# Patient Record
Sex: FEMALE | Race: WHITE | HISPANIC OR LATINO
[De-identification: names, ages, dates, MRNs, and addresses within clinical notes are randomized per-mention and may not be internally consistent; named-entity substitution may affect disease eponyms.]

---

## 2018-10-18 PROBLEM — Z00.00 ENCOUNTER FOR PREVENTIVE HEALTH EXAMINATION: Status: ACTIVE | Noted: 2018-10-18

## 2019-01-15 ENCOUNTER — APPOINTMENT (OUTPATIENT)
Dept: RHEUMATOLOGY | Facility: CLINIC | Age: 54
End: 2019-01-15

## 2019-04-02 ENCOUNTER — APPOINTMENT (OUTPATIENT)
Dept: RHEUMATOLOGY | Facility: CLINIC | Age: 54
End: 2019-04-02

## 2019-05-20 ENCOUNTER — APPOINTMENT (OUTPATIENT)
Dept: NEUROSURGERY | Facility: CLINIC | Age: 54
End: 2019-05-20
Payer: MEDICAID

## 2019-05-20 VITALS
BODY MASS INDEX: 32.78 KG/M2 | WEIGHT: 185 LBS | HEART RATE: 72 BPM | DIASTOLIC BLOOD PRESSURE: 83 MMHG | TEMPERATURE: 98.6 F | SYSTOLIC BLOOD PRESSURE: 120 MMHG | HEIGHT: 63 IN

## 2019-05-20 DIAGNOSIS — Z78.9 OTHER SPECIFIED HEALTH STATUS: ICD-10-CM

## 2019-05-20 DIAGNOSIS — Z87.39 PERSONAL HISTORY OF OTHER DISEASES OF THE MUSCULOSKELETAL SYSTEM AND CONNECTIVE TISSUE: ICD-10-CM

## 2019-05-20 PROCEDURE — 99204 OFFICE O/P NEW MOD 45 MIN: CPT

## 2019-05-20 RX ORDER — PRAMIPEXOLE DIHYDROCHLORIDE 0.5 MG/1
0.5 TABLET ORAL
Refills: 0 | Status: ACTIVE | COMMUNITY

## 2019-05-24 NOTE — ASSESSMENT
[FreeTextEntry1] : Ms. Mcleod presents with above history and imaging.  Upon review of [facility] imaging, it reveals listhesis at L5 with bilateral spondylosis and extruded disc at L5 - S1 which may be contributing to her radicular pain.  She will obtain a CT lumbar spine to further assess the bone and listhesis.  She should follow up after imaging.  She will follow up with Dr. Sterling for review of MRI cervical spine.  She should continue conservative measures in the interim.  She knows to call the office if there are any new or worsening symptoms.

## 2019-05-24 NOTE — REASON FOR VISIT
[New Patient Visit] : a new patient visit [Referred By: _________] : Patient was referred by LENY [FreeTextEntry1] : left lumbar radiculopathy

## 2019-05-24 NOTE — DATA REVIEWED
[de-identified] : Listhesis at L5 with bilateral spondylosis.  Extruded disc also noted at L5 - S1 extending superiorly.  Prominent bilateral foraminal stenosis.

## 2019-05-24 NOTE — HISTORY OF PRESENT ILLNESS
[> 3 months] : more  than 3 months [Pain] : pain [Weakness] : weakness [Numbness] : numbness [Worsening] : worsening [Daily] : ~He/She~ states the symptoms seem to be occuring daily [Sitting] : worsened by sitting [Standing] : worsened by standing [Exercise Regimen] : relieved by exercise regimen [NSAIDs] : relieved by nonsteroidal anti-inflammatory drugs [Recumbency] : relieved by recumbency [Rest] : relieved by rest [FreeTextEntry1] : lower back and LE pain  [de-identified] : Ms. Mcleod is a 53 year old female who presents for neurosurgical evaluation of lumbar and LE pain.  PMH includes RA and osteoarthritis. Patient previously evaluated by Dr. Sterling, who ordered MRI cervical spine imaging for evaluation of intermittent unsteadiness of gait, pending f/u discussion this week.  She mentions her symptoms started around 3 months ago.  She recalls no trauma but she was "hoola-hooping"  which incited the pain 2 days later.  She reports lower back pain with radiation to left buttock/posterior lateral thigh/calf and foot.  It is described as numbness/tingling.  No RLE pain.  LE > LBP.  Pain intensity 8/10.  At its worse 10/10.  Denies any saddle anesthesia, urinary or bowel dysfunction.  She is ambulating independently.  No unexplained falls.  Intermittent unsteadiness of gait.   She states the pain is aggravated by walking, standing and lying down.  It is improved with sitting.  She has tried about 4 weeks of physical therapy but does not report any improvement.  She has never tried any pain management.  No LE EMG. She is managing her symptoms with NSAIDS and muscle relaxers.  [Ataxia] : no ataxia [Loss of Dexterity] : good dexterity [Incontinence] : no incontinence [Urinary Ret.] : no urinary retention

## 2019-05-24 NOTE — PHYSICAL EXAM
[General Appearance - Alert] : alert [General Appearance - In No Acute Distress] : in no acute distress [General Appearance - Well Nourished] : well nourished [General Appearance - Well Developed] : well developed [General Appearance - Well-Appearing] : healthy appearing [] : normal voice and communication [Oriented To Time, Place, And Person] : oriented to person, place, and time [Impaired Insight] : insight and judgment were intact [Affect] : the affect was normal [Mood] : the mood was normal [Memory Recent] : recent memory was not impaired [Memory Remote] : remote memory was not impaired [Person] : oriented to person [Place] : oriented to place [Time] : oriented to time [Short Term Intact] : short term memory intact [Concentration Intact] : normal concentrating ability [Fluency] : fluency intact [Comprehension] : comprehension intact [Cranial Nerves Optic (II)] : visual acuity intact bilaterally,  pupils equal round and reactive to light [Cranial Nerves Oculomotor (III)] : extraocular motion intact [Cranial Nerves Trigeminal (V)] : facial sensation intact symmetrically [Cranial Nerves Facial (VII)] : face symmetrical [Motor Tone] : muscle tone was normal in all four extremities [Motor Strength] : muscle strength was normal in all four extremities [Sensation Tactile Decrease] : light touch was intact [Sensation Pain / Temperature Decrease] : pain and temperature was intact [Abnormal Walk] : normal gait [Balance] : balance was intact [No Visual Abnormalities] : no visible abnormailities [Normal] : normal [Over the Past 2 Weeks, Have You Felt Down, Depressed, or Hopeless?] : 1.) Over the past 2 weeks, have you felt down, depressed, or hopeless? No [Over the Past 2 Weeks, Have You Felt Little Interest or Pleasure Doing Things?] : 2.) Over the past 2 weeks, have you felt little interest or pleasure doing things? No

## 2019-05-30 ENCOUNTER — APPOINTMENT (OUTPATIENT)
Dept: NEUROSURGERY | Facility: CLINIC | Age: 54
End: 2019-05-30
Payer: MEDICAID

## 2019-06-03 ENCOUNTER — APPOINTMENT (OUTPATIENT)
Dept: NEUROSURGERY | Facility: CLINIC | Age: 54
End: 2019-06-03
Payer: MEDICAID

## 2019-06-03 VITALS
WEIGHT: 185 LBS | DIASTOLIC BLOOD PRESSURE: 82 MMHG | HEART RATE: 66 BPM | HEIGHT: 63 IN | SYSTOLIC BLOOD PRESSURE: 134 MMHG | BODY MASS INDEX: 32.78 KG/M2 | TEMPERATURE: 98.5 F

## 2019-06-03 PROCEDURE — 99214 OFFICE O/P EST MOD 30 MIN: CPT | Mod: 57

## 2019-06-10 ENCOUNTER — OUTPATIENT (OUTPATIENT)
Dept: OUTPATIENT SERVICES | Facility: HOSPITAL | Age: 54
LOS: 1 days | End: 2019-06-10
Payer: COMMERCIAL

## 2019-06-10 VITALS
HEART RATE: 66 BPM | TEMPERATURE: 98 F | HEIGHT: 63 IN | RESPIRATION RATE: 20 BRPM | WEIGHT: 176.37 LBS | SYSTOLIC BLOOD PRESSURE: 120 MMHG | DIASTOLIC BLOOD PRESSURE: 70 MMHG

## 2019-06-10 DIAGNOSIS — M43.16 SPONDYLOLISTHESIS, LUMBAR REGION: ICD-10-CM

## 2019-06-10 DIAGNOSIS — Z01.818 ENCOUNTER FOR OTHER PREPROCEDURAL EXAMINATION: ICD-10-CM

## 2019-06-10 DIAGNOSIS — Z98.890 OTHER SPECIFIED POSTPROCEDURAL STATES: Chronic | ICD-10-CM

## 2019-06-10 DIAGNOSIS — M19.90 UNSPECIFIED OSTEOARTHRITIS, UNSPECIFIED SITE: ICD-10-CM

## 2019-06-10 DIAGNOSIS — Z29.9 ENCOUNTER FOR PROPHYLACTIC MEASURES, UNSPECIFIED: ICD-10-CM

## 2019-06-10 LAB
ANION GAP SERPL CALC-SCNC: 9 MMOL/L — SIGNIFICANT CHANGE UP (ref 5–17)
APTT BLD: 35.4 SEC — SIGNIFICANT CHANGE UP (ref 27.5–36.3)
BASOPHILS # BLD AUTO: 0 K/UL — SIGNIFICANT CHANGE UP (ref 0–0.2)
BASOPHILS NFR BLD AUTO: 0.5 % — SIGNIFICANT CHANGE UP (ref 0–2)
BLD GP AB SCN SERPL QL: SIGNIFICANT CHANGE UP
BUN SERPL-MCNC: 18 MG/DL — SIGNIFICANT CHANGE UP (ref 8–20)
CALCIUM SERPL-MCNC: 9.6 MG/DL — SIGNIFICANT CHANGE UP (ref 8.6–10.2)
CHLORIDE SERPL-SCNC: 106 MMOL/L — SIGNIFICANT CHANGE UP (ref 98–107)
CO2 SERPL-SCNC: 28 MMOL/L — SIGNIFICANT CHANGE UP (ref 22–29)
CREAT SERPL-MCNC: 0.95 MG/DL — SIGNIFICANT CHANGE UP (ref 0.5–1.3)
EOSINOPHIL # BLD AUTO: 0.1 K/UL — SIGNIFICANT CHANGE UP (ref 0–0.5)
EOSINOPHIL NFR BLD AUTO: 1.7 % — SIGNIFICANT CHANGE UP (ref 0–6)
GLUCOSE SERPL-MCNC: 93 MG/DL — SIGNIFICANT CHANGE UP (ref 70–115)
HBA1C BLD-MCNC: 5.3 % — SIGNIFICANT CHANGE UP (ref 4–5.6)
HCT VFR BLD CALC: 40.8 % — SIGNIFICANT CHANGE UP (ref 37–47)
HGB BLD-MCNC: 13 G/DL — SIGNIFICANT CHANGE UP (ref 12–16)
INR BLD: 1.03 RATIO — SIGNIFICANT CHANGE UP (ref 0.88–1.16)
LYMPHOCYTES # BLD AUTO: 1.2 K/UL — SIGNIFICANT CHANGE UP (ref 1–4.8)
LYMPHOCYTES # BLD AUTO: 16.7 % — LOW (ref 20–55)
MCHC RBC-ENTMCNC: 27.7 PG — SIGNIFICANT CHANGE UP (ref 27–31)
MCHC RBC-ENTMCNC: 31.9 G/DL — LOW (ref 32–36)
MCV RBC AUTO: 86.8 FL — SIGNIFICANT CHANGE UP (ref 81–99)
MONOCYTES # BLD AUTO: 0.6 K/UL — SIGNIFICANT CHANGE UP (ref 0–0.8)
MONOCYTES NFR BLD AUTO: 8.7 % — SIGNIFICANT CHANGE UP (ref 3–10)
MRSA PCR RESULT.: SIGNIFICANT CHANGE UP
NEUTROPHILS # BLD AUTO: 5.4 K/UL — SIGNIFICANT CHANGE UP (ref 1.8–8)
NEUTROPHILS NFR BLD AUTO: 72.3 % — SIGNIFICANT CHANGE UP (ref 37–73)
PLATELET # BLD AUTO: 342 K/UL — SIGNIFICANT CHANGE UP (ref 150–400)
POTASSIUM SERPL-MCNC: 4.8 MMOL/L — SIGNIFICANT CHANGE UP (ref 3.5–5.3)
POTASSIUM SERPL-SCNC: 4.8 MMOL/L — SIGNIFICANT CHANGE UP (ref 3.5–5.3)
PROTHROM AB SERPL-ACNC: 11.9 SEC — SIGNIFICANT CHANGE UP (ref 10–12.9)
RBC # BLD: 4.7 M/UL — SIGNIFICANT CHANGE UP (ref 4.4–5.2)
RBC # FLD: 15.6 % — SIGNIFICANT CHANGE UP (ref 11–15.6)
S AUREUS DNA NOSE QL NAA+PROBE: SIGNIFICANT CHANGE UP
SODIUM SERPL-SCNC: 143 MMOL/L — SIGNIFICANT CHANGE UP (ref 135–145)
TYPE + AB SCN PNL BLD: SIGNIFICANT CHANGE UP
WBC # BLD: 7.5 K/UL — SIGNIFICANT CHANGE UP (ref 4.8–10.8)
WBC # FLD AUTO: 7.5 K/UL — SIGNIFICANT CHANGE UP (ref 4.8–10.8)

## 2019-06-10 PROCEDURE — 80048 BASIC METABOLIC PNL TOTAL CA: CPT

## 2019-06-10 PROCEDURE — 87640 STAPH A DNA AMP PROBE: CPT

## 2019-06-10 PROCEDURE — G0463: CPT

## 2019-06-10 PROCEDURE — 87641 MR-STAPH DNA AMP PROBE: CPT

## 2019-06-10 PROCEDURE — 36415 COLL VENOUS BLD VENIPUNCTURE: CPT

## 2019-06-10 PROCEDURE — 86901 BLOOD TYPING SEROLOGIC RH(D): CPT

## 2019-06-10 PROCEDURE — 86850 RBC ANTIBODY SCREEN: CPT

## 2019-06-10 PROCEDURE — 85730 THROMBOPLASTIN TIME PARTIAL: CPT

## 2019-06-10 PROCEDURE — 85610 PROTHROMBIN TIME: CPT

## 2019-06-10 PROCEDURE — 85027 COMPLETE CBC AUTOMATED: CPT

## 2019-06-10 PROCEDURE — 83036 HEMOGLOBIN GLYCOSYLATED A1C: CPT

## 2019-06-10 PROCEDURE — 86900 BLOOD TYPING SEROLOGIC ABO: CPT

## 2019-06-10 PROCEDURE — 93005 ELECTROCARDIOGRAM TRACING: CPT

## 2019-06-10 PROCEDURE — 93010 ELECTROCARDIOGRAM REPORT: CPT

## 2019-06-10 RX ORDER — PRAMIPEXOLE DIHYDROCHLORIDE 0.12 MG/1
1 TABLET ORAL
Qty: 0 | Refills: 0 | DISCHARGE

## 2019-06-10 NOTE — H&P PST ADULT - ASSESSMENT
Pt is a 52 y/o female with PMH of arthritis and restless leg syndrome who presents for evaluation and testing for l5-s1 laminectomy,discectomy and posterior instrumented fusion. Pt states last April, she was trying to learn how to use a hula hoop. After two weeks of learning, pt woke up with lower back pain which worsened within the next two days. Pt consulted with PCP who ordered xray and MRI which showed Listhesis at L5 with bilateral spondylosis. Extruded disc also noted at L5 - S1 extending superiorly. Prominent bilateral foraminal stenosis. Pt went to Dr. Guzman who stated that she should have surgical intervention to relief pain. Pt wanted second opinion, so she went to Dr. Romero who verified need for surgery. Pt had PT last month for 3-4 weeks (15 visits)-no pain relief noted.  Pain described as constant sharp/stabbing pain ,10/10. Pain is worse with activity, and better with sitting. Pt denies use of OTC pain medication or prescribed rx for pain.     OPIOID RISK TOOL    ALTAGRACIA EACH BOX THAT APPLIES AND ADD TOTALS AT THE END    FAMILY HISTORY OF SUBSTANCE ABUSE                 FEMALE         MALE                                                Alcohol                             [  ]1 pt          [  ]3pts                                               Illegal Durgs                     [  ]2 pts        [  ]3pts                                               Rx Drugs                           [  ]4 pts        [  ]4 pts    PERSONAL HISTORY OF SUBSTANCE ABUSE                                                                                          Alcohol                             [  ]3 pts       [  ]3 pts                                               Illegal Drugs                     [  ]4 pts        [  ]4 pts                                               Rx Drugs                           [  ]5 pts        [  ]5 pts    AGE BETWEEN 16-45 YEARS                                      [  ]1 pt         [  ]1 pt    HISTORY OF PREADOLESCENT   SEXUAL ABUSE                                                             [  ]3 pts        [  ]0pts    PSYCHOLOGICAL DISEASE                     ADD, OCD, Bipolar, Schizophrenia        [  ]2 pts         [  ]2 pts                      Depression                                               [  ]1 pt           [  ]1 pt           SCORING TOTAL   (add numbers and type here)              (0)                                     A score of 3 or lower indicated LOW risk for future opioid abuse  A score of 4 to 7 indicated moderate risk for future opioid abuse  A score of 8 or higher indicates a high risk for opioid abuse  CAPRINI VTE 2.0 SCORE [CLOT updated 2019]    AGE RELATED RISK FACTORS                                                       MOBILITY RELATED FACTORS  [x ] Age 41-60 years                                            (1 Point)                    [ ] Bed rest                                                        (1 Point)  [ ] Age: 61-74 years                                           (2 Points)                  [ ] Plaster cast                                                   (2 Points)  [ ] Age= 75 years                                              (3 Points)                    [ ] Bed bound for more than 72 hours                 (2 Points)    DISEASE RELATED RISK FACTORS                                               GENDER SPECIFIC FACTORS  [ ] Edema in the lower extremities                       (1 Point)              [ ] Pregnancy                                                     (1 Point)  [ ] Varicose veins                                               (1 Point)                     [ ] Post-partum < 6 weeks                                   (1 Point)             [x ] BMI > 25 Kg/m2                                            (1 Point)                     [ ] Hormonal therapy  or oral contraception          (1 Point)                 [ ] Sepsis (in the previous month)                        (1 Point)               [ ] History of pregnancy complications                 (1 point)  [ ] Pneumonia or serious lung disease                                               [ ] Unexplained or recurrent                     (1 Point)           (in the previous month)                               (1 Point)  [ ] Abnormal pulmonary function test                     (1 Point)                 SURGERY RELATED RISK FACTORS  [ ] Acute myocardial infarction                              (1 Point)               [ ]  Section                                             (1 Point)  [ ] Congestive heart failure (in the previous month)  (1 Point)      [ ] Minor surgery                                                  (1 Point)   [ ] Inflammatory bowel disease                             (1 Point)               [ ] Arthroscopic surgery                                        (2 Points)  [ ] Central venous access                                      (2 Points)                [ x] General surgery lasting more than 45 minutes (2 points)  [ ] Malignancy- Present or previous                   (2 Points)                [ ] Elective arthroplasty                                         (5 points)    [ ] Stroke (in the previous month)                          (5 Points)                                                                                                                                                           HEMATOLOGY RELATED FACTORS                                                 TRAUMA RELATED RISK FACTORS  [ ] Prior episodes of VTE                                     (3 Points)                [ ] Fracture of the hip, pelvis, or leg                       (5 Points)  [ ] Positive family history for VTE                         (3 Points)             [ ] Acute spinal cord injury (in the previous month)  (5 Points)  [ ] Prothrombin 24839 A                                     (3 Points)               [ ] Paralysis  (less than 1 month)                             (5 Points)  [ ] Factor V Leiden                                             (3 Points)                  [ ] Multiple Trauma within 1 month                        (5 Points)  [ ] Lupus anticoagulants                                     (3 Points)                                                           [ ] Anticardiolipin antibodies                               (3 Points)                                                       [ ] High homocysteine in the blood                      (3 Points)                                             [ ] Other congenital or acquired thrombophilia      (3 Points)                                                [ ] Heparin induced thrombocytopenia                  (3 Points)                                     Total Score [    4     ]

## 2019-06-10 NOTE — PATIENT PROFILE ADULT - NSPROEDALEARNPREF_GEN_A_NUR
written material/Pre op teaching surgical scrub pain management instructions given/individual instruction/verbal instruction

## 2019-06-10 NOTE — H&P PST ADULT - NSICDXPROBLEM_GEN_ALL_CORE_FT
PROBLEM DIAGNOSES  Problem: Arthritis  Assessment and Plan: pt controlled with meloxicam      Problem: Prophylactic measure  Assessment and Plan: caprini score 4,scds ordered, surgical personnell to assess for pharm proph need    Problem: Spondylolisthesis, lumbar region  Assessment and Plan: L5-S1 laminectomy,disectomy and posterior instrumented fusion

## 2019-06-10 NOTE — H&P PST ADULT - NSANTHOSAYNRD_GEN_A_CORE
No. MEGHA screening performed.  STOP BANG Legend: 0-2 = LOW Risk; 3-4 = INTERMEDIATE Risk; 5-8 = HIGH Risk

## 2019-06-10 NOTE — H&P PST ADULT - HISTORY OF PRESENT ILLNESS
Pt is a 54 y/o female with PMH of arthritis and restless leg syndrome who presents for evaluation and testing for l5-s1 laminectomy,discectomy and posterior instrumented fusion. Pt states last April, she was trying to learn how to use a hula hoop. After two weeks of learning, pt woke up with lower back pain which worsened within the next two days. Pt consulted with PCP who ordered xray and MRI which showed Listhesis at L5 with bilateral spondylosis. Extruded disc also noted at L5 - S1 extending superiorly. Prominent bilateral foraminal stenosis. Pt went to Dr. Guzman who stated that she should have surgical intervention to relief pain. Pt wanted second opinion, so she went to Dr. Romero who verified need for surgery. Pt had PT last month for 3-4 weeks (15 visits)-no pain relief noted.  Pain described as constant sharp/stabbing pain ,10/10. Pain is worse with activity, and better with sitting. Pt denies use of OTC pain medication or prescribed rx for pain.

## 2019-06-10 NOTE — PATIENT PROFILE ADULT - NSPROSPHOSPCHAPLAINYN_GEN_A_NUR
+ migraine x3 days with no relief from OTC medications. . Pt reports experiencing weakness, dizziness, N/V and photophobia. Pt denies any fever or chills. Respirations are even and unlabored.   
no

## 2019-06-11 NOTE — REVIEW OF SYSTEMS
[As Noted in HPI] : as noted in HPI [Numbness] : numbness [Tingling] : tingling [Negative] : Heme/Lymph [Leg Weakness] : leg weakness [FreeTextEntry9] : LBP

## 2019-06-11 NOTE — REASON FOR VISIT
[Follow-Up: _____] : a [unfilled] follow-up visit [FreeTextEntry1] : Ms. Mcleod presents for follow up visit and surgical discussion. Recently followed up with Dr. Sterling and states wishes to pursue her care with our office.  She continues to  report lower back pain with radiation to left buttock/posterior lateral thigh/calf and foot. It is described as numbness/tingling. No RLE pain. LE > LBP. Pain intensity 8/10. At its worse 10/10. Denies any saddle anesthesia, urinary or bowel dysfunction. She is ambulating independently. No unexplained falls. Intermittent unsteadiness of gait. She states the pain is aggravated by walking, standing and lying down. It is improved with sitting. She has tried about 4 weeks of physical therapy but does not report any improvement. She has never tried any pain management. No LE EMG. She is managing her symptoms with NSAIDS and muscle relaxers.

## 2019-06-11 NOTE — ASSESSMENT
[FreeTextEntry1] : Ms. Mcleod presents for follow-up and review of imaging with interval history as above.  The patient has continued LE radicular pain referable to her large disk herniation.  I have recommended an L5-S1 laminectomy/diskectomy/posterior fusion and possible interbody with risks, benefits, and alternatives as outline below:\par benefit: hopeful decompression, hopeful pain relief/improvement in symptoms, hopeful stabilization, hopeful prevention of progression of deficit\par alternative: no surgical intervention, continued conservative measures (PT, pain management)\par risks: bleeding, infection, CSF leak, seizure, stroke, coma, death, DVT, PE, MI, PNA, UTI, difficulty/failure to intubate or extubate, worsening pain, new or worsening numbness, tingling, weakness, paralysis, sensory changes, difficulty/inability to ambulate, sexual dysfunction, incontinence, failure or procedure or instrumentation, adjacent level degeneration, need for re-operation\par Ms. Mcleod verbalizes her understanding of the above.  I have answered all her questions.  She wishes to proceed with surgical intervention.  She will, of course, require pre-operative optimization.  Ms. Mcleod knows to call the office with any new or concerning symptoms in the interim.

## 2019-06-11 NOTE — DATA REVIEWED
[de-identified] : CT of the LS spine shows a bilateral L5 pars defect. [de-identified] : Flexion-extension x-rays of the LS spine show no evidence of dynamic instability.

## 2019-06-11 NOTE — PHYSICAL EXAM
[General Appearance - Alert] : alert [General Appearance - In No Acute Distress] : in no acute distress [General Appearance - Well Nourished] : well nourished [General Appearance - Well Developed] : well developed [] : normal voice and communication [General Appearance - Well-Appearing] : healthy appearing [Impaired Insight] : insight and judgment were intact [Oriented To Time, Place, And Person] : oriented to person, place, and time [Affect] : the affect was normal [Mood] : the mood was normal [Memory Recent] : recent memory was not impaired [Memory Remote] : remote memory was not impaired [Person] : oriented to person [Place] : oriented to place [Time] : oriented to time [Short Term Intact] : short term memory intact [Concentration Intact] : normal concentrating ability [Comprehension] : comprehension intact [Fluency] : fluency intact [Cranial Nerves Oculomotor (III)] : extraocular motion intact [Cranial Nerves Optic (II)] : visual acuity intact bilaterally,  pupils equal round and reactive to light [Cranial Nerves Trigeminal (V)] : facial sensation intact symmetrically [Motor Tone] : muscle tone was normal in all four extremities [Cranial Nerves Facial (VII)] : face symmetrical [Sensation Tactile Decrease] : light touch was intact [Motor Strength] : muscle strength was normal in all four extremities [Abnormal Walk] : normal gait [Sensation Pain / Temperature Decrease] : pain and temperature was intact [Balance] : balance was intact [Normal] : normal [No Visual Abnormalities] : no visible abnormailities [Over the Past 2 Weeks, Have You Felt Down, Depressed, or Hopeless?] : 1.) Over the past 2 weeks, have you felt down, depressed, or hopeless? No [Over the Past 2 Weeks, Have You Felt Little Interest or Pleasure Doing Things?] : 2.) Over the past 2 weeks, have you felt little interest or pleasure doing things? No

## 2019-06-20 ENCOUNTER — TRANSCRIPTION ENCOUNTER (OUTPATIENT)
Age: 54
End: 2019-06-20

## 2019-06-21 ENCOUNTER — RESULT REVIEW (OUTPATIENT)
Age: 54
End: 2019-06-21

## 2019-06-21 ENCOUNTER — INPATIENT (INPATIENT)
Facility: HOSPITAL | Age: 54
LOS: 2 days | Discharge: HOME | DRG: 460 | End: 2019-06-24
Attending: NEUROLOGICAL SURGERY | Admitting: NEUROLOGICAL SURGERY
Payer: COMMERCIAL

## 2019-06-21 ENCOUNTER — APPOINTMENT (OUTPATIENT)
Dept: NEUROSURGERY | Facility: HOSPITAL | Age: 54
End: 2019-06-21
Payer: MEDICAID

## 2019-06-21 VITALS
HEIGHT: 63 IN | DIASTOLIC BLOOD PRESSURE: 77 MMHG | RESPIRATION RATE: 16 BRPM | SYSTOLIC BLOOD PRESSURE: 129 MMHG | WEIGHT: 177.91 LBS | TEMPERATURE: 99 F | HEART RATE: 989 BPM | OXYGEN SATURATION: 100 %

## 2019-06-21 DIAGNOSIS — M43.16 SPONDYLOLISTHESIS, LUMBAR REGION: ICD-10-CM

## 2019-06-21 DIAGNOSIS — Z98.890 OTHER SPECIFIED POSTPROCEDURAL STATES: Chronic | ICD-10-CM

## 2019-06-21 LAB — ABO RH CONFIRMATION: SIGNIFICANT CHANGE UP

## 2019-06-21 PROCEDURE — 63012 REMOVE LAMINA/FACETS LUMBAR: CPT | Mod: 59,62

## 2019-06-21 PROCEDURE — 22633 ARTHRD CMBN 1NTRSPC LUMBAR: CPT | Mod: 62

## 2019-06-21 PROCEDURE — 63012 REMOVE LAMINA/FACETS LUMBAR: CPT | Mod: 62,59

## 2019-06-21 PROCEDURE — 22612 ARTHRD PST TQ 1NTRSPC LUMBAR: CPT | Mod: AS

## 2019-06-21 PROCEDURE — 63047 LAM FACETEC & FORAMOT LUMBAR: CPT | Mod: AS

## 2019-06-21 PROCEDURE — 22840 INSERT SPINE FIXATION DEVICE: CPT | Mod: 80

## 2019-06-21 PROCEDURE — 22853 INSJ BIOMECHANICAL DEVICE: CPT

## 2019-06-21 PROCEDURE — 88304 TISSUE EXAM BY PATHOLOGIST: CPT | Mod: 26

## 2019-06-21 PROCEDURE — 22840 INSERT SPINE FIXATION DEVICE: CPT

## 2019-06-21 RX ORDER — SODIUM CHLORIDE 9 MG/ML
3 INJECTION INTRAMUSCULAR; INTRAVENOUS; SUBCUTANEOUS EVERY 8 HOURS
Refills: 0 | Status: DISCONTINUED | OUTPATIENT
Start: 2019-06-21 | End: 2019-06-21

## 2019-06-21 RX ORDER — NALOXONE HYDROCHLORIDE 4 MG/.1ML
0.1 SPRAY NASAL
Refills: 0 | Status: DISCONTINUED | OUTPATIENT
Start: 2019-06-21 | End: 2019-06-23

## 2019-06-21 RX ORDER — ONDANSETRON 8 MG/1
4 TABLET, FILM COATED ORAL ONCE
Refills: 0 | Status: DISCONTINUED | OUTPATIENT
Start: 2019-06-21 | End: 2019-06-21

## 2019-06-21 RX ORDER — ACETAMINOPHEN 500 MG
650 TABLET ORAL EVERY 6 HOURS
Refills: 0 | Status: DISCONTINUED | OUTPATIENT
Start: 2019-06-21 | End: 2019-06-24

## 2019-06-21 RX ORDER — HYDROMORPHONE HYDROCHLORIDE 2 MG/ML
30 INJECTION INTRAMUSCULAR; INTRAVENOUS; SUBCUTANEOUS
Refills: 0 | Status: DISCONTINUED | OUTPATIENT
Start: 2019-06-21 | End: 2019-06-21

## 2019-06-21 RX ORDER — PRAMIPEXOLE DIHYDROCHLORIDE 0.12 MG/1
0.5 TABLET ORAL DAILY
Refills: 0 | Status: DISCONTINUED | OUTPATIENT
Start: 2019-06-21 | End: 2019-06-24

## 2019-06-21 RX ORDER — HYDROMORPHONE HYDROCHLORIDE 2 MG/ML
30 INJECTION INTRAMUSCULAR; INTRAVENOUS; SUBCUTANEOUS
Refills: 0 | Status: DISCONTINUED | OUTPATIENT
Start: 2019-06-21 | End: 2019-06-23

## 2019-06-21 RX ORDER — SODIUM CHLORIDE 9 MG/ML
1000 INJECTION INTRAMUSCULAR; INTRAVENOUS; SUBCUTANEOUS
Refills: 0 | Status: DISCONTINUED | OUTPATIENT
Start: 2019-06-21 | End: 2019-06-23

## 2019-06-21 RX ORDER — ONDANSETRON 8 MG/1
4 TABLET, FILM COATED ORAL EVERY 6 HOURS
Refills: 0 | Status: DISCONTINUED | OUTPATIENT
Start: 2019-06-21 | End: 2019-06-24

## 2019-06-21 RX ORDER — FENTANYL CITRATE 50 UG/ML
25 INJECTION INTRAVENOUS
Refills: 0 | Status: DISCONTINUED | OUTPATIENT
Start: 2019-06-21 | End: 2019-06-21

## 2019-06-21 RX ORDER — PANTOPRAZOLE SODIUM 20 MG/1
40 TABLET, DELAYED RELEASE ORAL DAILY
Refills: 0 | Status: DISCONTINUED | OUTPATIENT
Start: 2019-06-21 | End: 2019-06-24

## 2019-06-21 RX ORDER — SODIUM CHLORIDE 9 MG/ML
1000 INJECTION, SOLUTION INTRAVENOUS
Refills: 0 | Status: DISCONTINUED | OUTPATIENT
Start: 2019-06-21 | End: 2019-06-21

## 2019-06-21 RX ADMIN — FENTANYL CITRATE 25 MICROGRAM(S): 50 INJECTION INTRAVENOUS at 12:45

## 2019-06-21 RX ADMIN — HYDROMORPHONE HYDROCHLORIDE 30 MILLILITER(S): 2 INJECTION INTRAMUSCULAR; INTRAVENOUS; SUBCUTANEOUS at 15:51

## 2019-06-21 RX ADMIN — HYDROMORPHONE HYDROCHLORIDE 30 MILLILITER(S): 2 INJECTION INTRAMUSCULAR; INTRAVENOUS; SUBCUTANEOUS at 19:04

## 2019-06-21 RX ADMIN — HYDROMORPHONE HYDROCHLORIDE 30 MILLILITER(S): 2 INJECTION INTRAMUSCULAR; INTRAVENOUS; SUBCUTANEOUS at 13:03

## 2019-06-21 RX ADMIN — FENTANYL CITRATE 25 MICROGRAM(S): 50 INJECTION INTRAVENOUS at 12:50

## 2019-06-21 NOTE — BRIEF OPERATIVE NOTE - OPERATION/FINDINGS
severe spondylosis
excellent decompression of thecal sac and bilateral L5 and S1 nerve roots; no CSF leak

## 2019-06-21 NOTE — PROGRESS NOTE ADULT - SUBJECTIVE AND OBJECTIVE BOX
INTERVAL HPI/OVERNIGHT EVENTS:  Post op check  Lumbar fusion with interbody L5-s1  Pt awake, alert, resp, oriented x3. following commands x 4 extrem. Pos back pain. Pt denies numbness or tingling of the lower extrem.     MEDICATIONS  (STANDING):  HYDROmorphone PCA (1 mG/mL) 30 milliLiter(s) PCA Continuous PCA Continuous  pantoprazole  Injectable 40 milliGRAM(s) IV Push daily  sodium chloride 0.9%. 1000 milliLiter(s) (75 mL/Hr) IV Continuous <Continuous>    MEDICATIONS  (PRN):  acetaminophen   Tablet .. 650 milliGRAM(s) Oral every 6 hours PRN Mild Pain (1 - 3)  acetaminophen   Tablet .. 650 milliGRAM(s) Oral every 6 hours PRN Temp greater or equal to 38.5C (101.3F)  naloxone Injectable 0.1 milliGRAM(s) IV Push every 3 minutes PRN For ANY of the following changes in patient status:  A. RR LESS THAN 10 breaths per minute, B. Oxygen saturation LESS THAN 90%, C. Sedation score of 6  ondansetron Injectable 4 milliGRAM(s) IV Push every 6 hours PRN Nausea    Allergies  No Known Allergies  Intolerances    Vital Signs Last 24 Hrs  T(C): 37 (21 Jun 2019 16:32), Max: 37.2 (21 Jun 2019 06:56)  T(F): 98.6 (21 Jun 2019 16:32), Max: 98.9 (21 Jun 2019 06:56)  HR: 72 (21 Jun 2019 16:32) (62 - 989)  BP: 116/77 (21 Jun 2019 16:32) (115/74 - 162/94)  BP(mean): --  RR: 18 (21 Jun 2019 16:32) (14 - 19)  SpO2: 99% (21 Jun 2019 16:32) (99% - 100%) BMI (kg/m2): 31.5 (06-21-19 @ 06:56)    PHYSICAL EXAM  GENERAL: NAD, well-groomed, well-developed  HEAD:  Atraumatic, Normocephalic  EYES: EOMI, PERRLA, conjunctiva and sclera clear  ENMT: Moist mucous membranes, no facial asym  NECK: Supple, No JVD, Normal thyroid  NERVOUS SYSTEM:  Alert & Oriented X3, Good concentration; Motor Strength 5/5 B/L upper and lower extremities; DTRs 2+ intact and symmetric  CHEST/LUNG: Clear bs bilaterally; No rales, rhonchi, wheezing, or rubs  HEART: Regular rate and rhythm; No murmurs, rubs, or gallops  ABDOMEN: Soft, Nontender, Nondistended; Bowel sounds present  EXTREMITIES:  2+ Peripheral Pulses, No clubbing, cyanosis, or edema  BACK: dressing intact small amount of serous sangous d/c JUWAN x 1 230 cc/surg. now 30 cc  sensory grossly intact.   LABS:      I&O's Detail    21 Jun 2019 07:01  -  21 Jun 2019 18:06  --------------------------------------------------------  IN:    lactated ringers.: 225 mL  Total IN: 225 mL    OUT:    Bulb: 230 mL    Indwelling Catheter - Urethral: 250 mL  Total OUT: 480 mL    Total NET: -255 mL        RADIOLOGY & ADDITIONAL TESTS:

## 2019-06-21 NOTE — PROGRESS NOTE ADULT - SUBJECTIVE AND OBJECTIVE BOX
NSGY Attg:    Patient seen.  No acute events since office visit.  Activity level limited secondary to LLE pain.  Denies RLE pain or significant back pain.    Exam:  A and O x 3  PERRL  EOMI  FS TML  DONALDSON to command  LE 5/5 bilaterally    I have re-explained the goals of surgery per the patient's request.  She is declining re-explanation of the risks or alternatives as previously discussed and documented in the outpatient chart.  I have answered all of her questions, as well as those of her family.    A/P:  - to OR for L5-S1 laminectomy, diskectomy, posterolateral instrumented fusion; possible L5-S1 interbody; possible multilevel lumbosacral decompression and fusion

## 2019-06-21 NOTE — BRIEF OPERATIVE NOTE - NSICDXBRIEFPOSTOP_GEN_ALL_CORE_FT
POST-OP DIAGNOSIS:  Spondylolisthesis, lumbar region 21-Jun-2019 15:49:40  Mateo Romero
POST-OP DIAGNOSIS:  Spondylolisthesis, lumbar region 21-Jun-2019 15:49:40  Mateo Romero

## 2019-06-21 NOTE — BRIEF OPERATIVE NOTE - NSICDXBRIEFPREOP_GEN_ALL_CORE_FT
PRE-OP DIAGNOSIS:  Spondylolisthesis, lumbar region 21-Jun-2019 15:49:26  Mateo Romero
PRE-OP DIAGNOSIS:  Spondylolisthesis, lumbar region 21-Jun-2019 15:49:26  Mateo Romero

## 2019-06-21 NOTE — PROGRESS NOTE ADULT - ASSESSMENT
53yf s/p lumbar fusion  plan  1. The drainage via JUWAN will be monitored.   2. check labs in am   3. Physical therapy ordered.  4. Pain control- pt on pca 53yf s/p lumbar fusion  plan  1. The drainage via JUWAN will be monitored.   2. check labs in am   3. Physical therapy ordered.  4. Pain control- pt on pca   5. Ct of the lumbar spine to determine the fusion

## 2019-06-21 NOTE — BRIEF OPERATIVE NOTE - NSICDXBRIEFPROCEDURE_GEN_ALL_CORE_FT
PROCEDURES:  Repair of spondylolysis of lumbar spine by posterior approach 21-Jun-2019 15:49:16  Mateo Romero
PROCEDURES:  Repair of spondylolysis of lumbar spine by posterior approach 21-Jun-2019 15:49:16  Mateo Romero

## 2019-06-22 LAB
ALBUMIN SERPL ELPH-MCNC: 3.6 G/DL — SIGNIFICANT CHANGE UP (ref 3.3–5.2)
ALP SERPL-CCNC: 58 U/L — SIGNIFICANT CHANGE UP (ref 40–120)
ALT FLD-CCNC: 14 U/L — SIGNIFICANT CHANGE UP
ANION GAP SERPL CALC-SCNC: 12 MMOL/L — SIGNIFICANT CHANGE UP (ref 5–17)
AST SERPL-CCNC: 20 U/L — SIGNIFICANT CHANGE UP
BILIRUB SERPL-MCNC: 0.4 MG/DL — SIGNIFICANT CHANGE UP (ref 0.4–2)
BUN SERPL-MCNC: 13 MG/DL — SIGNIFICANT CHANGE UP (ref 8–20)
CALCIUM SERPL-MCNC: 9 MG/DL — SIGNIFICANT CHANGE UP (ref 8.6–10.2)
CHLORIDE SERPL-SCNC: 104 MMOL/L — SIGNIFICANT CHANGE UP (ref 98–107)
CO2 SERPL-SCNC: 23 MMOL/L — SIGNIFICANT CHANGE UP (ref 22–29)
CREAT SERPL-MCNC: 0.45 MG/DL — LOW (ref 0.5–1.3)
GLUCOSE SERPL-MCNC: 115 MG/DL — SIGNIFICANT CHANGE UP (ref 70–115)
HCT VFR BLD CALC: 33.9 % — LOW (ref 37–47)
HGB BLD-MCNC: 10.9 G/DL — LOW (ref 12–16)
MCHC RBC-ENTMCNC: 27.4 PG — SIGNIFICANT CHANGE UP (ref 27–31)
MCHC RBC-ENTMCNC: 32.2 G/DL — SIGNIFICANT CHANGE UP (ref 32–36)
MCV RBC AUTO: 85.2 FL — SIGNIFICANT CHANGE UP (ref 81–99)
PLATELET # BLD AUTO: 244 K/UL — SIGNIFICANT CHANGE UP (ref 150–400)
POTASSIUM SERPL-MCNC: 4 MMOL/L — SIGNIFICANT CHANGE UP (ref 3.5–5.3)
POTASSIUM SERPL-SCNC: 4 MMOL/L — SIGNIFICANT CHANGE UP (ref 3.5–5.3)
PROT SERPL-MCNC: 6.3 G/DL — LOW (ref 6.6–8.7)
RBC # BLD: 3.98 M/UL — LOW (ref 4.4–5.2)
RBC # FLD: 15.4 % — SIGNIFICANT CHANGE UP (ref 11–15.6)
SODIUM SERPL-SCNC: 139 MMOL/L — SIGNIFICANT CHANGE UP (ref 135–145)
WBC # BLD: 12.5 K/UL — HIGH (ref 4.8–10.8)
WBC # FLD AUTO: 12.5 K/UL — HIGH (ref 4.8–10.8)

## 2019-06-22 RX ADMIN — Medication 650 MILLIGRAM(S): at 15:37

## 2019-06-22 RX ADMIN — PANTOPRAZOLE SODIUM 40 MILLIGRAM(S): 20 TABLET, DELAYED RELEASE ORAL at 11:30

## 2019-06-22 RX ADMIN — Medication 650 MILLIGRAM(S): at 16:00

## 2019-06-22 RX ADMIN — PRAMIPEXOLE DIHYDROCHLORIDE 0.5 MILLIGRAM(S): 0.12 TABLET ORAL at 11:30

## 2019-06-22 RX ADMIN — HYDROMORPHONE HYDROCHLORIDE 30 MILLILITER(S): 2 INJECTION INTRAMUSCULAR; INTRAVENOUS; SUBCUTANEOUS at 07:18

## 2019-06-22 RX ADMIN — HYDROMORPHONE HYDROCHLORIDE 30 MILLILITER(S): 2 INJECTION INTRAMUSCULAR; INTRAVENOUS; SUBCUTANEOUS at 19:09

## 2019-06-22 NOTE — PHYSICAL THERAPY INITIAL EVALUATION ADULT - MODIFIED CLINICAL TEST OF SENSORY INTEGRATION IN BALANCE TEST
Patient with no LOB throughout evaluation, all mobility performed with supervision, verbal cues, and assist for equipment management

## 2019-06-22 NOTE — PROGRESS NOTE ADULT - SUBJECTIVE AND OBJECTIVE BOX
INTERVAL HPI/OVERNIGHT EVENTS:  Post op day # 1  Pt states that she ambulated with brace and felt overall good.  Pt states that she has incisional discomfort.  She states that she is comfortable with the pain pump provided. Pt is tolerating po diet.      MEDICATIONS  (STANDING):  HYDROmorphone PCA (1 mG/mL) 30 milliLiter(s) PCA Continuous PCA Continuous  pantoprazole  Injectable 40 milliGRAM(s) IV Push daily  pramipexole 0.5 milliGRAM(s) Oral daily  sodium chloride 0.9%. 1000 milliLiter(s) (75 mL/Hr) IV Continuous <Continuous>    MEDICATIONS  (PRN):  acetaminophen   Tablet .. 650 milliGRAM(s) Oral every 6 hours PRN Mild Pain (1 - 3)  acetaminophen   Tablet .. 650 milliGRAM(s) Oral every 6 hours PRN Temp greater or equal to 38.5C (101.3F)  naloxone Injectable 0.1 milliGRAM(s) IV Push every 3 minutes PRN For ANY of the following changes in patient status:  A. RR LESS THAN 10 breaths per minute, B. Oxygen saturation LESS THAN 90%, C. Sedation score of 6  ondansetron Injectable 4 milliGRAM(s) IV Push every 6 hours PRN Nausea      Allergies  No Known Allergies  Intolerances      Vital Signs Last 24 Hrs  T(C): 37.1 (22 Jun 2019 08:57), Max: 37.1 (22 Jun 2019 04:32)  T(F): 98.8 (22 Jun 2019 08:57), Max: 98.8 (22 Jun 2019 04:32)  HR: 70 (22 Jun 2019 11:03) (62 - 82)  BP: 126/79 (22 Jun 2019 08:57) (111/70 - 162/94)  BP(mean): --  RR: 18 (22 Jun 2019 11:03) (14 - 19)  SpO2: 99% (22 Jun 2019 11:03) (98% - 100%) BMI (kg/m2): 31.5 (06-21-19 @ 06:56)      PHYSICAL EXAM  GENERAL: NAD, well-groomed, well-developed  HEAD:  Atraumatic, Normocephalic  EYES: EOMI, PERRLA, conjunctiva and sclera clear  ENMT: No tonsillar erythema, exudates, or enlargement; Moist mucous membranes, Good dentition, No lesions  NECK: Supple, No JVD, Normal thyroid  NERVOUS SYSTEM:  Alert & Oriented X3, Good concentration; Motor Strength 5/5 B/L upper and lower extremities; DTRs 2+ intact and symmetric  CHEST/LUNG: Clear bs bilaterally; No rales, rhonchi, wheezing, or rubs  HEART: Regular rate and rhythm; No murmurs, rubs, or gallops  ABDOMEN: Soft, Nontender, Nondistended; Bowel sounds present  EXTREMITIES:  2+ Peripheral Pulses, No clubbing, cyanosis, or edema  WOUND: Dressing changed pos for serous sangous drainage. JPx1 360cc/ 24 hours 100cc this am    LABS:                          10.9   12.5  )-----------( 244      ( 22 Jun 2019 06:58 )             33.9     06-22    139  |  104  |  13.0  ----------------------------<  115  4.0   |  23.0  |  0.45<L>    Ca    9.0      22 Jun 2019 06:58    TPro  6.3<L>  /  Alb  3.6  /  TBili  0.4  /  DBili  x   /  AST  20  /  ALT  14  /  AlkPhos  58  06-22        I&O's Detail    21 Jun 2019 07:01  -  22 Jun 2019 07:00  --------------------------------------------------------  IN:    lactated ringers.: 225 mL    sodium chloride 0.9%.: 825 mL  Total IN: 1050 mL    OUT:    Bulb: 360 mL    Indwelling Catheter - Urethral: 2510 mL  Total OUT: 2870 mL    Total NET: -1820 mL      22 Jun 2019 07:01  -  22 Jun 2019 12:50  --------------------------------------------------------  IN:  Total IN: 0 mL    OUT:    Bulb: 160 mL  Total OUT: 160 mL    Total NET: -160 mL    RADIOLOGY & ADDITIONAL TESTS:

## 2019-06-22 NOTE — PHYSICAL THERAPY INITIAL EVALUATION ADULT - ADDITIONAL COMMENTS
Patient lives in an apartment in private home with 14 steps to enter with bilateral rails.  Patient owns a rolling walker and states that she has the ability to get a cane easily.  Patient states her cousin will be helping her initially.  Patient was previous independent in all mobility without device, (+) , works part time at [employer].

## 2019-06-22 NOTE — PROGRESS NOTE ADULT - ASSESSMENT
53yf s/p posterior lumbar fusion l5-s1 with interbody fusion    Plan  1. Increase activity and diet as tolerated.   2. Monitor drainage via JUWAN- will maintain for the next 24 additional hours.   3. Pt presently using the PCA for pain management.  4. Void trial once the pca discontinued.   5. Physical therapy ordered.

## 2019-06-23 LAB
ALBUMIN SERPL ELPH-MCNC: 3.6 G/DL — SIGNIFICANT CHANGE UP (ref 3.3–5.2)
ALP SERPL-CCNC: 61 U/L — SIGNIFICANT CHANGE UP (ref 40–120)
ALT FLD-CCNC: 11 U/L — SIGNIFICANT CHANGE UP
ANION GAP SERPL CALC-SCNC: 13 MMOL/L — SIGNIFICANT CHANGE UP (ref 5–17)
AST SERPL-CCNC: 14 U/L — SIGNIFICANT CHANGE UP
BILIRUB SERPL-MCNC: 0.3 MG/DL — LOW (ref 0.4–2)
BUN SERPL-MCNC: 13 MG/DL — SIGNIFICANT CHANGE UP (ref 8–20)
CALCIUM SERPL-MCNC: 8.9 MG/DL — SIGNIFICANT CHANGE UP (ref 8.6–10.2)
CHLORIDE SERPL-SCNC: 104 MMOL/L — SIGNIFICANT CHANGE UP (ref 98–107)
CO2 SERPL-SCNC: 24 MMOL/L — SIGNIFICANT CHANGE UP (ref 22–29)
CREAT SERPL-MCNC: 0.6 MG/DL — SIGNIFICANT CHANGE UP (ref 0.5–1.3)
GLUCOSE SERPL-MCNC: 100 MG/DL — SIGNIFICANT CHANGE UP (ref 70–115)
HCT VFR BLD CALC: 34.2 % — LOW (ref 37–47)
HGB BLD-MCNC: 10.9 G/DL — LOW (ref 12–16)
MCHC RBC-ENTMCNC: 27.4 PG — SIGNIFICANT CHANGE UP (ref 27–31)
MCHC RBC-ENTMCNC: 31.9 G/DL — LOW (ref 32–36)
MCV RBC AUTO: 85.9 FL — SIGNIFICANT CHANGE UP (ref 81–99)
PLATELET # BLD AUTO: 266 K/UL — SIGNIFICANT CHANGE UP (ref 150–400)
POTASSIUM SERPL-MCNC: 4.1 MMOL/L — SIGNIFICANT CHANGE UP (ref 3.5–5.3)
POTASSIUM SERPL-SCNC: 4.1 MMOL/L — SIGNIFICANT CHANGE UP (ref 3.5–5.3)
PROT SERPL-MCNC: 6.2 G/DL — LOW (ref 6.6–8.7)
RBC # BLD: 3.98 M/UL — LOW (ref 4.4–5.2)
RBC # FLD: 15.8 % — HIGH (ref 11–15.6)
SODIUM SERPL-SCNC: 141 MMOL/L — SIGNIFICANT CHANGE UP (ref 135–145)
WBC # BLD: 11 K/UL — HIGH (ref 4.8–10.8)
WBC # FLD AUTO: 11 K/UL — HIGH (ref 4.8–10.8)

## 2019-06-23 RX ORDER — DOCUSATE SODIUM 100 MG
100 CAPSULE ORAL THREE TIMES A DAY
Refills: 0 | Status: DISCONTINUED | OUTPATIENT
Start: 2019-06-23 | End: 2019-06-24

## 2019-06-23 RX ORDER — OXYCODONE HYDROCHLORIDE 5 MG/1
5 TABLET ORAL EVERY 4 HOURS
Refills: 0 | Status: DISCONTINUED | OUTPATIENT
Start: 2019-06-23 | End: 2019-06-24

## 2019-06-23 RX ORDER — OXYCODONE HYDROCHLORIDE 5 MG/1
10 TABLET ORAL EVERY 4 HOURS
Refills: 0 | Status: DISCONTINUED | OUTPATIENT
Start: 2019-06-23 | End: 2019-06-24

## 2019-06-23 RX ORDER — SENNA PLUS 8.6 MG/1
2 TABLET ORAL AT BEDTIME
Refills: 0 | Status: DISCONTINUED | OUTPATIENT
Start: 2019-06-23 | End: 2019-06-24

## 2019-06-23 RX ADMIN — OXYCODONE HYDROCHLORIDE 10 MILLIGRAM(S): 5 TABLET ORAL at 22:14

## 2019-06-23 RX ADMIN — PANTOPRAZOLE SODIUM 40 MILLIGRAM(S): 20 TABLET, DELAYED RELEASE ORAL at 10:28

## 2019-06-23 RX ADMIN — PRAMIPEXOLE DIHYDROCHLORIDE 0.5 MILLIGRAM(S): 0.12 TABLET ORAL at 10:28

## 2019-06-23 RX ADMIN — SENNA PLUS 2 TABLET(S): 8.6 TABLET ORAL at 21:14

## 2019-06-23 RX ADMIN — OXYCODONE HYDROCHLORIDE 10 MILLIGRAM(S): 5 TABLET ORAL at 14:20

## 2019-06-23 RX ADMIN — Medication 100 MILLIGRAM(S): at 13:39

## 2019-06-23 RX ADMIN — OXYCODONE HYDROCHLORIDE 10 MILLIGRAM(S): 5 TABLET ORAL at 13:39

## 2019-06-23 RX ADMIN — OXYCODONE HYDROCHLORIDE 10 MILLIGRAM(S): 5 TABLET ORAL at 21:14

## 2019-06-23 RX ADMIN — Medication 100 MILLIGRAM(S): at 21:14

## 2019-06-23 NOTE — PROGRESS NOTE ADULT - REASON FOR ADMISSION
left L5-S1 disk herniation with radiculopathy; bilateral pars defect
lumbar fusion
lumbar fusion
same day surg- lumbar fusion

## 2019-06-23 NOTE — PROGRESS NOTE ADULT - SUBJECTIVE AND OBJECTIVE BOX
INTERVAL HPI/OVERNIGHT EVENTS:  Post op day #2  Lumbar fusion  Pt states that she doing well this am. She tolerated po intake well.  Pt has ambulated without diff.   Pt PCA discontinued,     MEDICATIONS  (STANDING):  docusate sodium 100 milliGRAM(s) Oral three times a day  pantoprazole  Injectable 40 milliGRAM(s) IV Push daily  pramipexole 0.5 milliGRAM(s) Oral daily  senna 2 Tablet(s) Oral at bedtime    MEDICATIONS  (PRN):  acetaminophen   Tablet .. 650 milliGRAM(s) Oral every 6 hours PRN Mild Pain (1 - 3)  acetaminophen   Tablet .. 650 milliGRAM(s) Oral every 6 hours PRN Temp greater or equal to 38.5C (101.3F)  ondansetron Injectable 4 milliGRAM(s) IV Push every 6 hours PRN Nausea  oxyCODONE    IR 5 milliGRAM(s) Oral every 4 hours PRN Moderate Pain (4 - 6)  oxyCODONE    IR 10 milliGRAM(s) Oral every 4 hours PRN Severe Pain (7 - 10)      Allergies  No Known Allergies  Intolerances      Vital Signs Last 24 Hrs  T(C): 37.2 (23 Jun 2019 07:30), Max: 37.2 (22 Jun 2019 19:54)  T(F): 98.9 (23 Jun 2019 07:30), Max: 98.9 (22 Jun 2019 19:54)  HR: 79 (23 Jun 2019 07:30) (66 - 79)  BP: 110/79 (23 Jun 2019 07:30) (93/53 - 110/79)  BP(mean): --  RR: 18 (23 Jun 2019 07:30) (16 - 21)  SpO2: 99% (23 Jun 2019 07:30) (93% - 100%) BMI (kg/m2): 31.5 (06-21-19 @ 06:56)      PHYSICAL EXAM  GENERAL: NAD, well-groomed, well-developed  HEAD:  Atraumatic, Normocephalic  EYES: EOMI, PERRLA, conjunctiva and sclera clear  ENMT: No tonsillar erythema, exudates, or enlargement; Moist mucous membranes, Good dentition, No lesions  NECK: Supple, No JVD, Normal thyroid  NERVOUS SYSTEM:  Alert & Oriented X3, Good concentration; Motor Strength 5/5 B/L upper and lower extremities; DTRs 2+ intact and symmetric  CHEST/LUNG: Clear to percussion bilaterally; No rales, rhonchi, wheezing, or rubs  HEART: Regular rate and rhythm; No murmurs, rubs, or gallops  ABDOMEN: Soft, Nontender, Nondistended; Bowel sounds present  EXTREMITIES:  2+ Peripheral Pulses, No clubbing, cyanosis, or edema  LYMPH: No lymphadenopathy noted  SKIN: No rashes or lesions  Lumbar Dressing clean and dry JPx1 300cc/24 hours then 50cc today    LABS:                          10.9   11.0  )-----------( 266      ( 23 Jun 2019 07:12 )             34.2     06-23    141  |  104  |  13.0  ----------------------------<  100  4.1   |  24.0  |  0.60    Ca    8.9      23 Jun 2019 07:12    TPro  6.2<L>  /  Alb  3.6  /  TBili  0.3<L>  /  DBili  x   /  AST  14  /  ALT  11  /  AlkPhos  61  06-23        I&O's Detail    22 Jun 2019 07:01  -  23 Jun 2019 07:00  --------------------------------------------------------  IN:    sodium chloride 0.9%: 900 mL  Total IN: 900 mL    OUT:    Bulb: 300 mL    Indwelling Catheter - Urethral: 1980 mL  Total OUT: 2280 mL    Total NET: -1380 mL      23 Jun 2019 07:01  -  23 Jun 2019 13:02  --------------------------------------------------------  IN:  Total IN: 0 mL    OUT:    Bulb: 50 mL    Voided: 300 mL  Total OUT: 350 mL    Total NET: -350 mL        RADIOLOGY & ADDITIONAL TESTS:

## 2019-06-23 NOTE — PROGRESS NOTE ADULT - ASSESSMENT
53yf lumbar fusion   Plan  1. Pain med via oral   2. Void trial   3. Physical therapy   4. Drain-monitoring output will consider discontinuing  5. Awaiting ct of the lumbar spine r/o spinal fusion

## 2019-06-24 ENCOUNTER — TRANSCRIPTION ENCOUNTER (OUTPATIENT)
Age: 54
End: 2019-06-24

## 2019-06-24 VITALS
OXYGEN SATURATION: 96 % | DIASTOLIC BLOOD PRESSURE: 78 MMHG | HEART RATE: 87 BPM | SYSTOLIC BLOOD PRESSURE: 110 MMHG | TEMPERATURE: 98 F | RESPIRATION RATE: 18 BRPM

## 2019-06-24 PROCEDURE — 72131 CT LUMBAR SPINE W/O DYE: CPT | Mod: 26

## 2019-06-24 RX ORDER — MINERAL OIL
133 OIL (ML) MISCELLANEOUS DAILY
Refills: 0 | Status: DISCONTINUED | OUTPATIENT
Start: 2019-06-24 | End: 2019-06-24

## 2019-06-24 RX ORDER — MELOXICAM 15 MG/1
1 TABLET ORAL
Qty: 0 | Refills: 0 | DISCHARGE

## 2019-06-24 RX ORDER — OXYCODONE HYDROCHLORIDE 5 MG/1
1 TABLET ORAL
Qty: 30 | Refills: 0
Start: 2019-06-24 | End: 2019-06-28

## 2019-06-24 RX ORDER — MULTIVIT WITH MIN/MFOLATE/K2 340-15/3 G
1 POWDER (GRAM) ORAL ONCE
Refills: 0 | Status: COMPLETED | OUTPATIENT
Start: 2019-06-24 | End: 2019-06-24

## 2019-06-24 RX ADMIN — OXYCODONE HYDROCHLORIDE 10 MILLIGRAM(S): 5 TABLET ORAL at 05:46

## 2019-06-24 RX ADMIN — OXYCODONE HYDROCHLORIDE 5 MILLIGRAM(S): 5 TABLET ORAL at 15:23

## 2019-06-24 RX ADMIN — OXYCODONE HYDROCHLORIDE 10 MILLIGRAM(S): 5 TABLET ORAL at 06:46

## 2019-06-24 RX ADMIN — Medication 100 MILLIGRAM(S): at 05:46

## 2019-06-24 RX ADMIN — PANTOPRAZOLE SODIUM 40 MILLIGRAM(S): 20 TABLET, DELAYED RELEASE ORAL at 10:45

## 2019-06-24 RX ADMIN — OXYCODONE HYDROCHLORIDE 5 MILLIGRAM(S): 5 TABLET ORAL at 16:05

## 2019-06-24 RX ADMIN — Medication 1 BOTTLE: at 10:45

## 2019-06-24 RX ADMIN — Medication 100 MILLIGRAM(S): at 15:23

## 2019-06-24 RX ADMIN — PRAMIPEXOLE DIHYDROCHLORIDE 0.5 MILLIGRAM(S): 0.12 TABLET ORAL at 10:45

## 2019-06-24 NOTE — DISCHARGE NOTE PROVIDER - NSDCFUADDINST_GEN_ALL_CORE_FT
Keep your incision clean and dry. You may shower on Wednesday (6/26/19- 5 days after surgery). Avoid strenuous activity/lifting objects greater than 10 lbs. Follow up with Dr. Romero in 1 week.

## 2019-06-24 NOTE — DISCHARGE NOTE NURSING/CASE MANAGEMENT/SOCIAL WORK - NSDCDPATPORTLINK_GEN_ALL_CORE
Norco refilled. Thrush lozenges were prescribed and sent to her pharmacy- to use 5x/day until symptoms resolve. You can access the [facility], offered by [facility], by registering with the following website: [email or web address]

## 2019-06-24 NOTE — DISCHARGE NOTE PROVIDER - HOSPITAL COURSE
53 female with past medical history of arthritis and restless leg syndrome admitted 6/21/19 for elective L5-S1 laminectomy, discectomy, posterolateral instrumented fusion with Dr. Mateo Romero MD. Patient underwent aforementioned surgery without complications, with an estimated blood loss of 100 mL. Postoperatively, patient did well and had no complications. Pain was well controlled. An LSO brace was provided to wear when out of bed. Physical therapy followed the patient, deemed patient candidate for discharge home.

## 2019-06-24 NOTE — DISCHARGE NOTE PROVIDER - NSDCCPCAREPLAN_GEN_ALL_CORE_FT
PRINCIPAL DISCHARGE DIAGNOSIS  Diagnosis: Spondylolisthesis, lumbar region  Assessment and Plan of Treatment:

## 2019-06-24 NOTE — DISCHARGE NOTE PROVIDER - CARE PROVIDER_API CALL
Mateo Romero)  Neurosurgery  [facility], [address] [place] [address]  Phone: [phone]  Fax: [phone]  Follow Up Time:

## 2019-06-25 ENCOUNTER — INBOUND DOCUMENT (OUTPATIENT)
Age: 54
End: 2019-06-25

## 2019-06-25 LAB — SURGICAL PATHOLOGY STUDY: SIGNIFICANT CHANGE UP

## 2019-06-27 PROBLEM — G25.81 RESTLESS LEGS SYNDROME: Chronic | Status: ACTIVE | Noted: 2019-06-10

## 2019-06-27 PROBLEM — M19.90 UNSPECIFIED OSTEOARTHRITIS, UNSPECIFIED SITE: Chronic | Status: ACTIVE | Noted: 2019-06-10

## 2019-07-01 ENCOUNTER — APPOINTMENT (OUTPATIENT)
Dept: NEUROSURGERY | Facility: CLINIC | Age: 54
End: 2019-07-01
Payer: MEDICAID

## 2019-07-01 PROCEDURE — 99024 POSTOP FOLLOW-UP VISIT: CPT

## 2019-07-02 NOTE — REVIEW OF SYSTEMS
[As Noted in HPI] : as noted in HPI [Negative] : Heme/Lymph [Numbness] : no numbness [Tingling] : no tingling [Tension Headache] : no tension-type headache [Abnormal Sensation] : no abnormal sensation [Difficulty Walking] : no difficulty walking [FreeTextEntry9] : LBP

## 2019-07-02 NOTE — ASSESSMENT
[FreeTextEntry1] : Ms. Mcleod is quite pleased with her surgical results.  She has 90 % improvement of her her radicular pain.  She will start a course of physical therapy for core and quadriceps strengthening and conditioning.  She will continue her LSO bracing as needed.  She should follow up at the 1 month amna to assess her recovery with no imaging.  She knows to call the office if there are any new or worsening symptoms.

## 2019-07-02 NOTE — REASON FOR VISIT
[de-identified] : s/p L5 - S1 laminectomy, diskectomy, L5 - S1  posterior lateral fusion and interbody fusion [de-identified] : 6/21/2019 [de-identified] : Ms. Mcleod presents for post op visit and removal of staples.  She is pleased with her surgical results.  She presents and is compliant with LSO bracing.  She reports 90% improvement of her left lower radicular pain. Pain intensity 3/10.   She is ambulating independently.  Her incision is healing well.  No pain referable to incision.  She will be starting a course of out patient physical therapy for core and quadriceps strengthening.  Denies any fever or chills.

## 2019-07-02 NOTE — PHYSICAL EXAM
[General Appearance - Alert] : alert [General Appearance - In No Acute Distress] : in no acute distress [General Appearance - Well Nourished] : well nourished [General Appearance - Well Developed] : well developed [General Appearance - Well-Appearing] : healthy appearing [] : normal voice and communication [Longitudinal] : longitudinal [Clean] : clean [Healing Well] : healing well [Intact] : intact [Normal Skin] : normal [No Drainage] : without drainage [Impaired Insight] : insight and judgment were intact [Oriented To Time, Place, And Person] : oriented to person, place, and time [Affect] : the affect was normal [Mood] : the mood was normal [Person] : oriented to person [Place] : oriented to place [Time] : oriented to time [Short Term Intact] : short term memory intact [Concentration Intact] : normal concentrating ability [Fluency] : fluency intact [Comprehension] : comprehension intact [Cranial Nerves Optic (II)] : visual acuity intact bilaterally,  pupils equal round and reactive to light [Cranial Nerves Oculomotor (III)] : extraocular motion intact [Cranial Nerves Trigeminal (V)] : facial sensation intact symmetrically [Motor Tone] : muscle tone was normal in all four extremities [Motor Strength] : muscle strength was normal in all four extremities [Sensation Tactile Decrease] : light touch was intact [Sensation Pain / Temperature Decrease] : pain and temperature was intact [Abnormal Walk] : normal gait [Balance] : balance was intact [Normal] : normal [No Visual Abnormalities] : no visible abnormailities [Erythema] : not erythematous [Tender] : not tender [Over the Past 2 Weeks, Have You Felt Down, Depressed, or Hopeless?] : 1.) Over the past 2 weeks, have you felt down, depressed, or hopeless? No [FreeTextEntry1] : lumbar region  [Over the Past 2 Weeks, Have You Felt Little Interest or Pleasure Doing Things?] : 2.) Over the past 2 weeks, have you felt little interest or pleasure doing things? No

## 2019-07-10 PROCEDURE — 85027 COMPLETE CBC AUTOMATED: CPT

## 2019-07-10 PROCEDURE — 97116 GAIT TRAINING THERAPY: CPT

## 2019-07-10 PROCEDURE — 80053 COMPREHEN METABOLIC PANEL: CPT

## 2019-07-10 PROCEDURE — 97161 PT EVAL LOW COMPLEX 20 MIN: CPT

## 2019-07-10 PROCEDURE — C1889: CPT

## 2019-07-10 PROCEDURE — 88304 TISSUE EXAM BY PATHOLOGIST: CPT

## 2019-07-10 PROCEDURE — 72131 CT LUMBAR SPINE W/O DYE: CPT

## 2019-07-10 PROCEDURE — C1713: CPT

## 2019-07-10 PROCEDURE — 36415 COLL VENOUS BLD VENIPUNCTURE: CPT

## 2019-07-10 PROCEDURE — 76000 FLUOROSCOPY <1 HR PHYS/QHP: CPT

## 2019-07-10 PROCEDURE — 97110 THERAPEUTIC EXERCISES: CPT

## 2019-07-24 ENCOUNTER — LABORATORY RESULT (OUTPATIENT)
Age: 54
End: 2019-07-24

## 2019-07-25 ENCOUNTER — APPOINTMENT (OUTPATIENT)
Dept: RHEUMATOLOGY | Facility: CLINIC | Age: 54
End: 2019-07-25
Payer: MEDICAID

## 2019-07-25 VITALS
OXYGEN SATURATION: 97 % | DIASTOLIC BLOOD PRESSURE: 63 MMHG | SYSTOLIC BLOOD PRESSURE: 111 MMHG | WEIGHT: 180 LBS | HEART RATE: 66 BPM | RESPIRATION RATE: 14 BRPM | BODY MASS INDEX: 31.89 KG/M2 | HEIGHT: 63 IN

## 2019-07-25 DIAGNOSIS — M19.042 PRIMARY OSTEOARTHRITIS, RIGHT HAND: ICD-10-CM

## 2019-07-25 DIAGNOSIS — M19.071 PRIMARY OSTEOARTHRITIS, RIGHT ANKLE AND FOOT: ICD-10-CM

## 2019-07-25 DIAGNOSIS — M19.072 PRIMARY OSTEOARTHRITIS, RIGHT ANKLE AND FOOT: ICD-10-CM

## 2019-07-25 DIAGNOSIS — M25.50 PAIN IN UNSPECIFIED JOINT: ICD-10-CM

## 2019-07-25 DIAGNOSIS — M19.041 PRIMARY OSTEOARTHRITIS, RIGHT HAND: ICD-10-CM

## 2019-07-25 PROCEDURE — 99204 OFFICE O/P NEW MOD 45 MIN: CPT

## 2019-07-25 RX ORDER — IBUPROFEN 800 MG/1
TABLET, FILM COATED ORAL
Refills: 0 | Status: DISCONTINUED | COMMUNITY
End: 2019-07-25

## 2019-07-25 NOTE — HISTORY OF PRESENT ILLNESS
[FreeTextEntry1] : 53y F here for evaluation/treatment of arthrits\par \par - dx OA in past, affecting hands, feet\par - pain is currently 8/10 a/w 15 min stiffness daily. Relieved w/ meloxicam, takes 7.5mg/d\par - denies any redness, warmth, swelling of joints\par - also reports chronic fatigue rating 0/10 and 6/10 satisfied with ability to do daily activities \par - No personal or family history of IBD or psoriasis. Denies recent tick/insect bite, UTI, STI, or acute GI illness. No family history autoimmune disease\par - ROS: denies constitutional sxs of fever/chills, weight loss, alopecia, oral/nasal ulcers, sicca sxs, CP/SOB, hematuria/frothy urine, myalgias, rash, nodules, or photosensitivity. \par +Raynaud's [de-identified] : \par \par All other ROS negative except as mentioned in HPI.

## 2019-07-25 NOTE — PHYSICAL EXAM
[General Appearance - Alert] : alert [General Appearance - In No Acute Distress] : in no acute distress [General Appearance - Well Nourished] : well nourished [General Appearance - Well Developed] : well developed [General Appearance - Well-Appearing] : healthy appearing [Outer Ear] : the ears and nose were normal in appearance [Oropharynx] : the oropharynx was normal [Neck Appearance] : the appearance of the neck was normal [Neck Cervical Mass (___cm)] : no neck mass was observed [Jugular Venous Distention Increased] : there was no jugular-venous distention [Thyroid Diffuse Enlargement] : the thyroid was not enlarged [Thyroid Nodule] : there were no palpable thyroid nodules [Respiration, Rhythm And Depth] : normal respiratory rhythm and effort [Auscultation Breath Sounds / Voice Sounds] : lungs were clear to auscultation bilaterally [Heart Rate And Rhythm] : heart rate was normal and rhythm regular [Heart Sounds] : normal S1 and S2 [Heart Sounds Gallop] : no gallops [Murmurs] : no murmurs [Heart Sounds Pericardial Friction Rub] : no pericardial rub [Bowel Sounds] : normal bowel sounds [Abdomen Soft] : soft [Abdomen Tenderness] : non-tender [Abdomen Mass (___ Cm)] : no abdominal mass palpated [Cervical Lymph Nodes Enlarged Posterior Bilaterally] : posterior cervical [Cervical Lymph Nodes Enlarged Anterior Bilaterally] : anterior cervical [Supraclavicular Lymph Nodes Enlarged Bilaterally] : supraclavicular [Axillary Lymph Nodes Enlarged Bilaterally] : axillary [No CVA Tenderness] : no ~M costovertebral angle tenderness [No Spinal Tenderness] : no spinal tenderness [Abnormal Walk] : normal gait [Nail Clubbing] : no clubbing  or cyanosis of the fingernails [Musculoskeletal - Swelling] : no joint swelling seen [Motor Tone] : muscle strength and tone were normal [FreeTextEntry1] : \par Hands- OA changes in B/L hands with Heberden and Azael's nodes. \par Wrists- normal\par Elbows- normal\par Shoulders- normal\par Hips- Reduced external rotation bilaterally. \par Knees- Patellofemoral crepitus bilaterally without effusion. \par Ankles- normal\par Feet- normal\par MMT 10/10 proximally/distally bilaterally.  [Skin Color & Pigmentation] : normal skin color and pigmentation [Skin Turgor] : normal skin turgor [] : no rash [Skin Lesions] : no skin lesions [Deep Tendon Reflexes (DTR)] : deep tendon reflexes were 2+ and symmetric [Sensation] : the sensory exam was normal to light touch and pinprick [Motor Exam] : the motor exam was normal [No Focal Deficits] : no focal deficits [Oriented To Time, Place, And Person] : oriented to person, place, and time [Impaired Insight] : insight and judgment were intact [Affect] : the affect was normal [Mood] : the mood was normal

## 2019-07-25 NOTE — ASSESSMENT
[FreeTextEntry1] : 53y F with polyarthralgia due to degenerative OA affecting her hands and feet. She has good control w/ meloxicam 7.5mg daily. Denies any GI upset or other adverse effects.\par \par - plan to c/w meloxicam, may take up to 15mg/d total dose\par - labs to evaluate\par \par RTO 3 mos.

## 2019-08-01 ENCOUNTER — APPOINTMENT (OUTPATIENT)
Dept: NEUROSURGERY | Facility: CLINIC | Age: 54
End: 2019-08-01
Payer: MEDICAID

## 2019-08-01 VITALS
WEIGHT: 180 LBS | SYSTOLIC BLOOD PRESSURE: 130 MMHG | TEMPERATURE: 98.6 F | HEIGHT: 63 IN | HEART RATE: 69 BPM | DIASTOLIC BLOOD PRESSURE: 86 MMHG | BODY MASS INDEX: 31.89 KG/M2

## 2019-08-01 PROCEDURE — 99024 POSTOP FOLLOW-UP VISIT: CPT

## 2019-08-01 RX ORDER — OXYCODONE AND ACETAMINOPHEN 5; 325 MG/1; MG/1
5-325 TABLET ORAL
Qty: 56 | Refills: 0 | Status: ACTIVE | COMMUNITY
Start: 2019-08-01 | End: 1900-01-01

## 2019-08-02 NOTE — REASON FOR VISIT
[de-identified] : s/p L5-S1 laminectomy, diskectomy, L5-S1 posterior lateral fusion and interbody fusion [de-identified] : Ms. Mcleod presents for post op visit at 5 week amna.  She is pleased with her surgical results. She has discontinued the use of her LSO brace.  She reports 100% improvement of her left lower radicular pain. Pain intensity 3/10. She is ambulating independently. Her incision is healing well. No pain referable to incision.  She has completed 5 sessions of patient physical therapy for core and quadriceps strengthening. She does reports lower back tightness after PT but resolves shortly after.  Denies any fever or chills.  She is managing her symptoms with meloxicam and oxycodone as needed. \par  \par  [de-identified] : 06/21/2019

## 2019-08-02 NOTE — PHYSICAL EXAM
[General Appearance - Alert] : alert [General Appearance - In No Acute Distress] : in no acute distress [General Appearance - Well Developed] : well developed [General Appearance - Well Nourished] : well nourished [General Appearance - Well-Appearing] : healthy appearing [] : normal voice and communication [Longitudinal] : longitudinal [Clean] : clean [Dry] : dry [Healing Well] : healing well [Intact] : intact [No Drainage] : without drainage [Normal Skin] : normal [Impaired Insight] : insight and judgment were intact [Oriented To Time, Place, And Person] : oriented to person, place, and time [Affect] : the affect was normal [Mood] : the mood was normal [Place] : oriented to place [Person] : oriented to person [Time] : oriented to time [Short Term Intact] : short term memory intact [Concentration Intact] : normal concentrating ability [Comprehension] : comprehension intact [Fluency] : fluency intact [Cranial Nerves Optic (II)] : visual acuity intact bilaterally,  pupils equal round and reactive to light [Cranial Nerves Oculomotor (III)] : extraocular motion intact [Motor Tone] : muscle tone was normal in all four extremities [Cranial Nerves Trigeminal (V)] : facial sensation intact symmetrically [Motor Strength] : muscle strength was normal in all four extremities [Sensation Tactile Decrease] : light touch was intact [Sensation Pain / Temperature Decrease] : pain and temperature was intact [Abnormal Walk] : normal gait [Balance] : balance was intact [Normal] : normal [No Visual Abnormalities] : no visible abnormailities [Erythema] : not erythematous [Tender] : not tender [FreeTextEntry1] : lumbar region  [Over the Past 2 Weeks, Have You Felt Down, Depressed, or Hopeless?] : 1.) Over the past 2 weeks, have you felt down, depressed, or hopeless? No [Over the Past 2 Weeks, Have You Felt Little Interest or Pleasure Doing Things?] : 2.) Over the past 2 weeks, have you felt little interest or pleasure doing things? No [FreeTextEntry6] : LE 5/5 bilaterally

## 2019-08-02 NOTE — ASSESSMENT
[FreeTextEntry1] : Ms. Mcleod is doing well from the post operative perspective.  She reports 100% resolution of LE radicular pain. She will continue physical therapy for core and quadriceps strengthening and conditioning. Her oxycodone 5/325 has been renewed for severe pain.  She will follow up in 1 month without any imaging.  She should call the office if there are any new or worsening symptoms.

## 2019-08-09 RX ORDER — OXYCODONE AND ACETAMINOPHEN 5; 325 MG/1; MG/1
5-325 TABLET ORAL
Qty: 28 | Refills: 0 | Status: ACTIVE | COMMUNITY
Start: 2019-08-09 | End: 1900-01-01

## 2019-08-26 ENCOUNTER — RX RENEWAL (OUTPATIENT)
Age: 54
End: 2019-08-26

## 2019-09-27 ENCOUNTER — RX RENEWAL (OUTPATIENT)
Age: 54
End: 2019-09-27

## 2019-10-02 ENCOUNTER — APPOINTMENT (OUTPATIENT)
Dept: NEUROSURGERY | Facility: CLINIC | Age: 54
End: 2019-10-02

## 2019-10-03 ENCOUNTER — APPOINTMENT (OUTPATIENT)
Dept: NEUROSURGERY | Facility: CLINIC | Age: 54
End: 2019-10-03
Payer: MEDICAID

## 2019-10-03 VITALS
DIASTOLIC BLOOD PRESSURE: 67 MMHG | HEIGHT: 63 IN | SYSTOLIC BLOOD PRESSURE: 125 MMHG | BODY MASS INDEX: 34.55 KG/M2 | WEIGHT: 195 LBS

## 2019-10-03 PROCEDURE — 99213 OFFICE O/P EST LOW 20 MIN: CPT

## 2019-10-03 RX ORDER — MELOXICAM 7.5 MG/1
7.5 TABLET ORAL
Qty: 30 | Refills: 0 | Status: ACTIVE | COMMUNITY
Start: 2019-08-26 | End: 1900-01-01

## 2019-10-03 NOTE — REVIEW OF SYSTEMS
[Feeling Tired] : feeling tired [As Noted in HPI] : as noted in HPI [Incontinence] : no incontinence [Negative] : Heme/Lymph [FreeTextEntry9] : LBP

## 2019-10-03 NOTE — PHYSICAL EXAM
[General Appearance - Alert] : alert [General Appearance - In No Acute Distress] : in no acute distress [General Appearance - Well Nourished] : well nourished [General Appearance - Well-Appearing] : healthy appearing [General Appearance - Well Developed] : well developed [] : normal voice and communication [Longitudinal] : longitudinal [Clean] : clean [Dry] : dry [Well-Healed] : well-healed [Intact] : intact [No Drainage] : without drainage [Normal Skin] : normal [Erythema] : not erythematous [Tender] : not tender [Fluctuant] : not fluctuant [Normal Skin Turgor] : skin turgor was normal [FreeTextEntry1] : lumbar region  [Oriented To Time, Place, And Person] : oriented to person, place, and time [Impaired Insight] : insight and judgment were intact [Affect] : the affect was normal [Over the Past 2 Weeks, Have You Felt Down, Depressed, or Hopeless?] : 1.) Over the past 2 weeks, have you felt down, depressed, or hopeless? No [Mood] : the mood was normal [Over the Past 2 Weeks, Have You Felt Little Interest or Pleasure Doing Things?] : 2.) Over the past 2 weeks, have you felt little interest or pleasure doing things? No [Person] : oriented to person [Place] : oriented to place [Time] : oriented to time [Short Term Intact] : short term memory intact [Concentration Intact] : normal concentrating ability [Comprehension] : comprehension intact [Fluency] : fluency intact [Cranial Nerves Optic (II)] : visual acuity intact bilaterally,  pupils equal round and reactive to light [Cranial Nerves Oculomotor (III)] : extraocular motion intact [Cranial Nerves Trigeminal (V)] : facial sensation intact symmetrically [Motor Tone] : muscle tone was normal in all four extremities [Motor Strength] : muscle strength was normal in all four extremities [Sensation Pain / Temperature Decrease] : pain and temperature was intact [Sensation Tactile Decrease] : light touch was intact [Abnormal Walk] : normal gait [Balance] : balance was intact [No Visual Abnormalities] : no visible abnormailities [Normal] : normal

## 2019-10-03 NOTE — ASSESSMENT
[FreeTextEntry1] : Ms. Mcleod is at the 3 month amna and doing quite well from the post operative perspective.  She has 100% resolution of her LE radicular pain.   Patient will obtain a CT lumbar spine w/o contrast to assess the bony fusion and hardware. She should follow after imaging.  She knows to call the office if there are any worsening symptoms.

## 2019-12-06 LAB
14-3-3 ETA AG SER IA-MCNC: <0.2 NG/ML
A PHAGOCYTOPH IGG TITR SER IF: NORMAL TITER
ALBUMIN SERPL ELPH-MCNC: 4.2 G/DL
ALP BLD-CCNC: 75 U/L
ALT SERPL-CCNC: 15 U/L
ANA PAT FLD IF-IMP: ABNORMAL
ANACR T: ABNORMAL
ANION GAP SERPL CALC-SCNC: 11 MMOL/L
AST SERPL-CCNC: 15 U/L
B BURGDOR AB SER QL IA: NEGATIVE
B BURGDOR AB SER-IMP: NEGATIVE
B BURGDOR IGM PATRN SER IB-IMP: NEGATIVE
B BURGDOR18/20KD IGM SER QL IB: NORMAL
B BURGDOR18KD IGG SER QL IB: NORMAL
B BURGDOR23KD IGG SER QL IB: NORMAL
B BURGDOR23KD IGM SER QL IB: NORMAL
B BURGDOR28KD AB SER QL IB: NORMAL
B BURGDOR28KD IGG SER QL IB: NORMAL
B BURGDOR30KD AB SER QL IB: NORMAL
B BURGDOR30KD IGG SER QL IB: NORMAL
B BURGDOR31KD IGG SER QL IB: NORMAL
B BURGDOR31KD IGM SER QL IB: NORMAL
B BURGDOR39KD IGG SER QL IB: NORMAL
B BURGDOR39KD IGM SER QL IB: NORMAL
B BURGDOR41KD IGG SER QL IB: NORMAL
B BURGDOR41KD IGM SER QL IB: NORMAL
B BURGDOR45KD AB SER QL IB: NORMAL
B BURGDOR45KD IGG SER QL IB: NORMAL
B BURGDOR58KD AB SER QL IB: NORMAL
B BURGDOR58KD IGG SER QL IB: NORMAL
B BURGDOR66KD IGG SER QL IB: PRESENT
B BURGDOR66KD IGM SER QL IB: NORMAL
B BURGDOR93KD IGG SER QL IB: NORMAL
B BURGDOR93KD IGM SER QL IB: NORMAL
B MICROTI IGG TITR SER: NORMAL TITER
BASOPHILS # BLD AUTO: 0.07 K/UL
BASOPHILS NFR BLD AUTO: 1.1 %
BILIRUB SERPL-MCNC: 0.2 MG/DL
BUN SERPL-MCNC: 19 MG/DL
CALCIUM SERPL-MCNC: 9.4 MG/DL
CCP AB SER IA-ACNC: 10 UNITS
CHLORIDE SERPL-SCNC: 106 MMOL/L
CO2 SERPL-SCNC: 26 MMOL/L
CREAT SERPL-MCNC: 0.66 MG/DL
CRP SERPL-MCNC: 0.11 MG/DL
E CHAFFEENSIS IGG TITR SER IF: NORMAL TITER
EOSINOPHIL # BLD AUTO: 0.14 K/UL
EOSINOPHIL NFR BLD AUTO: 2.2 %
ERYTHROCYTE [SEDIMENTATION RATE] IN BLOOD BY WESTERGREN METHOD: 19 MM/HR
GLUCOSE SERPL-MCNC: 86 MG/DL
HCT VFR BLD CALC: 36.4 %
HGB BLD-MCNC: 10.9 G/DL
HLA-B27 RELATED AG QL: NORMAL
IMM GRANULOCYTES NFR BLD AUTO: 0.2 %
LYMPHOCYTES # BLD AUTO: 1.44 K/UL
LYMPHOCYTES NFR BLD AUTO: 22.9 %
MAN DIFF?: NORMAL
MCHC RBC-ENTMCNC: 28 PG
MCHC RBC-ENTMCNC: 29.9 GM/DL
MCV RBC AUTO: 93.6 FL
MONOCYTES # BLD AUTO: 0.48 K/UL
MONOCYTES NFR BLD AUTO: 7.6 %
NEUTROPHILS # BLD AUTO: 4.14 K/UL
NEUTROPHILS NFR BLD AUTO: 66 %
PLATELET # BLD AUTO: 259 K/UL
POTASSIUM SERPL-SCNC: 4.4 MMOL/L
PROT SERPL-MCNC: 6.7 G/DL
RBC # BLD: 3.89 M/UL
RBC # FLD: 16.4 %
RF+CCP IGG SER-IMP: NEGATIVE
RHEUMATOID FACT SER QL: <10 IU/ML
SODIUM SERPL-SCNC: 143 MMOL/L
URATE SERPL-MCNC: 3.6 MG/DL
WBC # FLD AUTO: 6.28 K/UL

## 2021-04-30 ENCOUNTER — APPOINTMENT (OUTPATIENT)
Dept: NEUROSURGERY | Facility: CLINIC | Age: 56
End: 2021-04-30
Payer: MEDICAID

## 2021-04-30 VITALS
BODY MASS INDEX: 34.38 KG/M2 | WEIGHT: 194 LBS | HEIGHT: 63 IN | TEMPERATURE: 97.4 F | SYSTOLIC BLOOD PRESSURE: 110 MMHG | OXYGEN SATURATION: 98 % | DIASTOLIC BLOOD PRESSURE: 70 MMHG | HEART RATE: 80 BPM

## 2021-04-30 PROCEDURE — 99213 OFFICE O/P EST LOW 20 MIN: CPT

## 2021-04-30 PROCEDURE — 99072 ADDL SUPL MATRL&STAF TM PHE: CPT

## 2021-04-30 NOTE — ASSESSMENT
[FreeTextEntry1] : Ms. Mcleod presents with above history.\par \par She is neurologically intact\par \par Plan:\par Xray lumbar spine  to assess lumbar hardware or any acute findings.\par f/u after imaging.\par Patient knows to call the office if there are any new or worsening symptoms.\par

## 2021-04-30 NOTE — PHYSICAL EXAM
[General Appearance - Alert] : alert [General Appearance - In No Acute Distress] : in no acute distress [General Appearance - Well Nourished] : well nourished [General Appearance - Well Developed] : well developed [General Appearance - Well-Appearing] : healthy appearing [] : normal voice and communication [Longitudinal] : longitudinal [FreeTextEntry1] : lumbar region  [Oriented To Time, Place, And Person] : oriented to person, place, and time [Impaired Insight] : insight and judgment were intact [Affect] : the affect was normal [Mood] : the mood was normal [Over the Past 2 Weeks, Have You Felt Down, Depressed, or Hopeless?] : 1.) Over the past 2 weeks, have you felt down, depressed, or hopeless? No [Over the Past 2 Weeks, Have You Felt Little Interest or Pleasure Doing Things?] : 2.) Over the past 2 weeks, have you felt little interest or pleasure doing things? No [Person] : oriented to person [Place] : oriented to place [Time] : oriented to time [Short Term Intact] : short term memory intact [Concentration Intact] : normal concentrating ability [Fluency] : fluency intact [Comprehension] : comprehension intact [Cranial Nerves Optic (II)] : visual acuity intact bilaterally,  pupils equal round and reactive to light [Cranial Nerves Oculomotor (III)] : extraocular motion intact [Cranial Nerves Trigeminal (V)] : facial sensation intact symmetrically [Motor Tone] : muscle tone was normal in all four extremities [Sensation Tactile Decrease] : light touch was intact [Motor Strength] : muscle strength was normal in all four extremities [Sensation Pain / Temperature Decrease] : pain and temperature was intact [Abnormal Walk] : normal gait [Balance] : balance was intact [No Visual Abnormalities] : no visible abnormailities [Normal] : normal

## 2021-04-30 NOTE — REASON FOR VISIT
[Follow-Up: _____] : a [unfilled] follow-up visit [FreeTextEntry1] : Ms. Mcleod presents for  follow-up visit, post s/p L5-S1 laminectomy, diskectomy, L5-S1 posterior lateral fusion 6/21/2019.  Last seen 10/2019. \par \par Overall, she has been doing quite well.  Patient endorses an acute exacerbation of lower back and left lower extremity pain x 2 weeks after being involved with yoga. Pain intensity 7/10. She is ambulating independently. Her incision is healing well. No pain referable to incision. She has completed 15 sessions of patient physical therapy for core and quadriceps strengthening. She does reports lower back tightness after PT but resolves shortly after. Denies any fever or chills. She is managing her symptoms as Tylenol as needed. \par

## 2021-05-10 RX ORDER — METHYLPREDNISOLONE 4 MG/1
4 TABLET ORAL
Qty: 1 | Refills: 0 | Status: ACTIVE | COMMUNITY
Start: 2021-05-10 | End: 1900-01-01

## 2021-07-01 ENCOUNTER — APPOINTMENT (OUTPATIENT)
Dept: NEUROSURGERY | Facility: CLINIC | Age: 56
End: 2021-07-01
Payer: MEDICAID

## 2021-07-01 VITALS
DIASTOLIC BLOOD PRESSURE: 78 MMHG | HEART RATE: 75 BPM | HEIGHT: 63 IN | OXYGEN SATURATION: 97 % | WEIGHT: 193 LBS | BODY MASS INDEX: 34.2 KG/M2 | SYSTOLIC BLOOD PRESSURE: 113 MMHG | TEMPERATURE: 97.6 F

## 2021-07-01 PROCEDURE — 99213 OFFICE O/P EST LOW 20 MIN: CPT

## 2021-07-01 NOTE — HISTORY OF PRESENT ILLNESS
[FreeTextEntry1] : Ms. Mcleod presents for follow-up.  She denies back pain.  She has some recurrence of left and right LE radicular pain that is exacerbated with standing.  She denies weakness or incontinence.  The patient presents for review of imaging.

## 2021-07-01 NOTE — DATA REVIEWED
[de-identified] : MRI of the LS spine was reviewed along with the official report.  There is decompression at the L5-S1 level without residual central or foraminal stenosis.  There is minimal increase in the L4-5 disk bulge with relatively mild central or foraminal stenosis.   [de-identified] : X-ray of the LS spine shows good placement of hardware.

## 2021-07-01 NOTE — PHYSICAL EXAM
[General Appearance - Alert] : alert [General Appearance - In No Acute Distress] : in no acute distress [General Appearance - Well Nourished] : well nourished [General Appearance - Well-Appearing] : healthy appearing [General Appearance - Well Developed] : well developed [Longitudinal] : longitudinal [Clean] : clean [Dry] : dry [Healing Well] : healing well [Intact] : intact [Normal Skin] : normal [No Drainage] : without drainage [Erythema] : not erythematous [Warm] : not warm [Fluctuant] : not fluctuant [Oriented To Time, Place, And Person] : oriented to person, place, and time [Person] : oriented to person [Place] : oriented to place [Time] : oriented to time [Short Term Intact] : short term memory intact [Concentration Intact] : normal concentrating ability [Fluency] : fluency intact [Cranial Nerves Optic (II)] : visual acuity intact bilaterally,  pupils equal round and reactive to light [Cranial Nerves Oculomotor (III)] : extraocular motion intact [Motor Tone] : muscle tone was normal in all four extremities [Motor Strength] : muscle strength was normal in all four extremities [Abnormal Walk] : normal gait [FreeTextEntry6] : LE 5/5 bilaterally

## 2021-07-01 NOTE — ASSESSMENT
[FreeTextEntry1] : Ms. Mcleod presents for follow-up with interval history and imaging as above.  I have referred the patient to physiatry (Dr. Bruce) for additional evaluation and treatment.  I will see her back in 6-8 weeks to assess her progress with continued conservative measures.  Ms. Mcleod knows to call the office with any new or concerning symptoms in the interim.

## 2021-08-04 ENCOUNTER — APPOINTMENT (OUTPATIENT)
Dept: PHYSICAL MEDICINE AND REHAB | Facility: CLINIC | Age: 56
End: 2021-08-04

## 2021-10-21 ENCOUNTER — APPOINTMENT (OUTPATIENT)
Dept: PHYSICAL MEDICINE AND REHAB | Facility: CLINIC | Age: 56
End: 2021-10-21
Payer: MEDICAID

## 2021-10-21 VITALS — SYSTOLIC BLOOD PRESSURE: 124 MMHG | DIASTOLIC BLOOD PRESSURE: 83 MMHG | HEART RATE: 70 BPM

## 2021-10-21 DIAGNOSIS — M25.552 PAIN IN RIGHT HIP: ICD-10-CM

## 2021-10-21 DIAGNOSIS — M47.816 SPONDYLOSIS W/OUT MYELOPATHY OR RADICULOPATHY, LUMBAR REGION: ICD-10-CM

## 2021-10-21 DIAGNOSIS — M54.16 RADICULOPATHY, LUMBAR REGION: ICD-10-CM

## 2021-10-21 DIAGNOSIS — M43.16 SPONDYLOLISTHESIS, LUMBAR REGION: ICD-10-CM

## 2021-10-21 DIAGNOSIS — M70.61 TROCHANTERIC BURSITIS, RIGHT HIP: ICD-10-CM

## 2021-10-21 DIAGNOSIS — M25.551 PAIN IN RIGHT HIP: ICD-10-CM

## 2021-10-21 DIAGNOSIS — M70.62 TROCHANTERIC BURSITIS, RIGHT HIP: ICD-10-CM

## 2021-10-21 DIAGNOSIS — M48.061 SPINAL STENOSIS, LUMBAR REGION WITHOUT NEUROGENIC CLAUDICATION: ICD-10-CM

## 2021-10-21 PROCEDURE — 99204 OFFICE O/P NEW MOD 45 MIN: CPT

## 2021-10-21 NOTE — ASSESSMENT
[FreeTextEntry1] : Ms. AUDREY SALAZAR is a 56 year old female who presents with bilateral lateral hip/buttock pain likely secondary greater trochanteric bursitis, unlikely SI joint related or radicular in nature, although patient does have some radicular symptoms. Denies any red flag signs. Will recommend:\par - Neurosurgery and Rheumatology notes reviewed. MRI L Spine reviewed. \par - Given that patient has tried PT and HEP, spoke to patient about a L GTB steroid injection for which she would like to hold off for now given her pain has improved. \par - X-ray bilateral hips to evaluate for any bony changes. \par - Could consider ALLEN in future if GTB in attempted and patient does not get significant relief. \par \par RTC in 4 weeks. Patient aware of red flag signs including any changes to their bowel/bladder control, groin numbness or new weakness. Patient knows to seek immediate attention by calling 911 or going to nearest ER if these symptoms appear.

## 2021-10-21 NOTE — HISTORY OF PRESENT ILLNESS
[FreeTextEntry1] : Ms. AUDREY SALAZAR is a 56 year old female who presents with bilateral lateral thigh/upper buttock pain\par \par Location:L>R buttock/lateral hip area\par Onset:About 6 months ago, no specific triggering event\par Provocation/Palliative:Worse with going up and down stairs, usually better with rest\par Quality:Achy and sharp\par Radiation:Goes down to back of leg and front of shins\par Severity:Was recently severe but over last couple of days pain has improved\par Timing:Intermittent\par \par Does have some associated numbness down to her bilateral shins. Denies any associated leg weakness. Denies any loss of bowel/bladder control or any groin numbness.\par Previous medications trialed:Tylenol, Meloxicam daily with great relief\par Previous procedures relevant to complaint:Has history of L5-S1 fusion 6/21/19\par Conservative therapy tried?:Tried PT without significant relief, HEP without relief

## 2021-10-21 NOTE — DATA REVIEWED
[FreeTextEntry1] : MRI L Spine 21  radiology reviewed by me: s/p L5-S1 fusion, disc bulge at L4-5\par \par PATIENT NAME: Chiquita Mcleod\par PATIENT PHONE NUMBER:\par PATIENT ID: [id number]\par : 1965\par DATE OF EXAM: 2021\par R. Phys. Name: Donya Schwab\par R. Phys. Address: [address] [place] [address]\par R. Phys. Phone: [phone]\par MRI-LUMBAR SPINE NON CONTRAST\par \par HISTORY: M54.5 Lower Back Pain M54.42 Lower back pain with left\par sciatica R20.0 Numbness Lower/Upper Extremity M48.061 Spinal\par stenosis, lumbar region without neurogenic martha M62.81 Muscle\par weakness Z98.1 Spinal fusion R26.2 Difficulty Walking\par R20.2 Upper and Lower Extremity Pins and Needles\par \par Technique: Multiplanar, multisequence MRI of the lumbar spine was performed\par without the administration of intravenous contrast .\par \par Findings:\par \par Priors: April 3, 2019\par \par Alignment: Grade 1 anterolisthesis at L5-S1 decreased from previous examination\par \par Conus: The conus is normal in size and signal.\par \par Bone marrow: No evidence of metastatic disease or acute vertebral body\par compression fracture.\par \par Discs:See below.\par \par At L5-S1: Status post posterior fusion, interbody fusion and laminectomy without\par significant residual spinal canal stenosis or nerve root compression. There is\par grade 1 anterolisthesis.\par \par At L4-5: There is disc bulge with moderate bilateral neuroforaminal stenosis\par mildly increased from previous study. There is moderate stenosis subarticular\par recesses bilaterally\par \par At L3-4: There is facet arthropathy and disc bulge and facet arthropathy with\par mild bilateral neural foraminal stenosis.\par \par At L2-3: No significant spinal canal or neural foraminal stenosis.\par \par At L1-2: There is mild disc bulge without significant spinal canal or neural\par foraminal stenosis.\par \par At T12/L1 there is shallow left central disc protrusion without significant\par spinal canal stenosis.\par \par IMPRESSION:\par \par \par Status post interval fusion and laminectomy at L5/S1 without significant\par residual spinal canal stenosis or nerve root compression.\par \par Disc bulge at L4-L5 with moderate bilateral neural foraminal and subarticular\par recess stenosis.\par \par ICD 10 -\par Degeneration lumbar spine, 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 [phone] 02:15 PM\par

## 2021-10-21 NOTE — PHYSICAL EXAM
[FreeTextEntry1] : PE:\par Constitutional: In NAD, calm and cooperative\par HEENT: NCAT, Anicteric sclera, no lid-lag\par Cardio: Extremities appear pink and well perfused, no peripheral edema\par Respiratory: Normal respiratory effort on room air, no accessory muscle use\par Skin: no rashes seen on exposed skin, no visible abrasions\par Psych: Normal affect, intact judgment and insight\par Neuro: Awake, alert and oriented x 3, see below for focused neurological exam\par MSK:\par 	Inspection: no gross swelling identified\par 	Palpation: No Tenderness of the bilateral lower lumbar paraspinals or over PSIS. TTP over L>>R GTB\par 	ROM:No pain at end lumbar extension/flexion, pain at end ER of bilateral hips\par 	Strength: 5/5 strength in bilateral lower extremities\par 	Reflexes: 2+ Patella reflex bilaterally, 2+ Achilles reflex bilaterally, negative clonus bilaterally\par 	Sensation: Intact to light touch in bilateral lower extremities\par Special tests:\par SLR:negative bilaterally\par SATURNINO:positive L>R\par FADIR: negative bilaterally\par Facet loading: negative bilaterally\par Yoeman's: negative bilaterally\par ASIS compression: negative bilaterally

## 2021-11-18 ENCOUNTER — APPOINTMENT (OUTPATIENT)
Dept: PHYSICAL MEDICINE AND REHAB | Facility: CLINIC | Age: 56
End: 2021-11-18

## 2023-05-16 NOTE — REASON FOR VISIT
- - - [Follow-Up: _____] : a [unfilled] follow-up visit [FreeTextEntry1] : Ms. Mcleod presents for post op visit at 5 week amna. She is pleased with her surgical results. She has discontinued the use of her LSO brace. She reports 100% improvement of her left lower radicular pain. Pain intensity 3/10. She is ambulating independently. Her incision is healing well. No pain referable to incision. She has completed 15 sessions of patient physical therapy for core and quadriceps strengthening. She does reports lower back tightness after PT but resolves shortly after. Denies any fever or chills. She is managing her symptoms as Tylenol as needed.